# Patient Record
Sex: FEMALE | Race: WHITE | NOT HISPANIC OR LATINO | Employment: OTHER | URBAN - METROPOLITAN AREA
[De-identification: names, ages, dates, MRNs, and addresses within clinical notes are randomized per-mention and may not be internally consistent; named-entity substitution may affect disease eponyms.]

---

## 2017-01-25 LAB
CHOLEST SERPL-MSCNC: 213 MG/DL (ref 0–200)
HDLC SERPL-MCNC: 34 MG/DL
LDLC SERPL CALC-MCNC: 111 MG/DL
NONHDLC SERPL-MCNC: 179 MG/DL
TRIGL SERPL-MCNC: 342 MG/DL
TSH SERPL DL<=0.05 MIU/L-ACNC: 2.33 UIU/ML

## 2017-05-08 ENCOUNTER — LAB VISIT (OUTPATIENT)
Dept: LAB | Facility: HOSPITAL | Age: 82
End: 2017-05-08
Attending: FAMILY MEDICINE
Payer: MEDICARE

## 2017-05-08 ENCOUNTER — OFFICE VISIT (OUTPATIENT)
Dept: INTERNAL MEDICINE | Facility: CLINIC | Age: 82
End: 2017-05-08
Payer: MEDICARE

## 2017-05-08 VITALS
OXYGEN SATURATION: 97 % | DIASTOLIC BLOOD PRESSURE: 78 MMHG | SYSTOLIC BLOOD PRESSURE: 136 MMHG | HEIGHT: 63 IN | WEIGHT: 176.81 LBS | BODY MASS INDEX: 31.33 KG/M2 | TEMPERATURE: 100 F | HEART RATE: 79 BPM

## 2017-05-08 DIAGNOSIS — Z76.89 ESTABLISHING CARE WITH NEW DOCTOR, ENCOUNTER FOR: ICD-10-CM

## 2017-05-08 DIAGNOSIS — E03.9 ACQUIRED HYPOTHYROIDISM: ICD-10-CM

## 2017-05-08 DIAGNOSIS — R26.89 DECREASED MOBILITY: ICD-10-CM

## 2017-05-08 DIAGNOSIS — I27.21 PULMONARY HYPERTENSIVE ARTERIAL DISEASE: ICD-10-CM

## 2017-05-08 DIAGNOSIS — E55.9 UNSPECIFIED VITAMIN D DEFICIENCY: ICD-10-CM

## 2017-05-08 DIAGNOSIS — I10 ESSENTIAL HYPERTENSION: ICD-10-CM

## 2017-05-08 DIAGNOSIS — J30.2 SEASONAL ALLERGIC RHINITIS, UNSPECIFIED ALLERGIC RHINITIS TRIGGER: ICD-10-CM

## 2017-05-08 DIAGNOSIS — N18.4 CKD (CHRONIC KIDNEY DISEASE) STAGE 4, GFR 15-29 ML/MIN: ICD-10-CM

## 2017-05-08 DIAGNOSIS — Z95.0 PACEMAKER: ICD-10-CM

## 2017-05-08 DIAGNOSIS — I63.9 CEREBELLAR INFARCT: ICD-10-CM

## 2017-05-08 DIAGNOSIS — I63.9 CEREBROVASCULAR ACCIDENT (CVA), UNSPECIFIED MECHANISM: ICD-10-CM

## 2017-05-08 DIAGNOSIS — G25.81 RESTLESS LEG: ICD-10-CM

## 2017-05-08 DIAGNOSIS — Z76.89 ESTABLISHING CARE WITH NEW DOCTOR, ENCOUNTER FOR: Primary | ICD-10-CM

## 2017-05-08 DIAGNOSIS — E78.5 HYPERLIPIDEMIA, UNSPECIFIED HYPERLIPIDEMIA TYPE: ICD-10-CM

## 2017-05-08 DIAGNOSIS — M79.605 LEG PAIN, BILATERAL: ICD-10-CM

## 2017-05-08 DIAGNOSIS — M79.604 LEG PAIN, BILATERAL: ICD-10-CM

## 2017-05-08 DIAGNOSIS — I25.10 CORONARY ARTERY DISEASE INVOLVING NATIVE CORONARY ARTERY OF NATIVE HEART WITHOUT ANGINA PECTORIS: ICD-10-CM

## 2017-05-08 DIAGNOSIS — I70.0 ATHEROSCLEROSIS OF AORTA: ICD-10-CM

## 2017-05-08 DIAGNOSIS — I12.9 HYPERTENSIVE RENAL DISEASE, STAGE 1-4 OR UNSPECIFIED CHRONIC KIDNEY DISEASE: ICD-10-CM

## 2017-05-08 DIAGNOSIS — I35.0 AORTIC VALVE STENOSIS, UNSPECIFIED ETIOLOGY: ICD-10-CM

## 2017-05-08 PROBLEM — Z79.01 CHRONIC ANTICOAGULATION: Status: RESOLVED | Noted: 2017-05-08 | Resolved: 2017-05-08

## 2017-05-08 PROBLEM — Z79.01 CHRONIC ANTICOAGULATION: Status: ACTIVE | Noted: 2017-05-08

## 2017-05-08 PROBLEM — I48.91 ATRIAL FIBRILLATION: Status: ACTIVE | Noted: 2017-05-08

## 2017-05-08 PROBLEM — I48.91 ATRIAL FIBRILLATION: Status: RESOLVED | Noted: 2017-05-08 | Resolved: 2017-05-08

## 2017-05-08 LAB
25(OH)D3+25(OH)D2 SERPL-MCNC: 34 NG/ML
ALBUMIN SERPL BCP-MCNC: 4.1 G/DL
ALP SERPL-CCNC: 92 U/L
ALT SERPL W/O P-5'-P-CCNC: 17 U/L
ANION GAP SERPL CALC-SCNC: 10 MMOL/L
AST SERPL-CCNC: 27 U/L
BILIRUB SERPL-MCNC: 0.6 MG/DL
BUN SERPL-MCNC: 24 MG/DL
CALCIUM SERPL-MCNC: 10.3 MG/DL
CHLORIDE SERPL-SCNC: 99 MMOL/L
CO2 SERPL-SCNC: 29 MMOL/L
CREAT SERPL-MCNC: 1.3 MG/DL
EST. GFR  (AFRICAN AMERICAN): 41.4 ML/MIN/1.73 M^2
EST. GFR  (NON AFRICAN AMERICAN): 36 ML/MIN/1.73 M^2
GLUCOSE SERPL-MCNC: 105 MG/DL
POTASSIUM SERPL-SCNC: 3.7 MMOL/L
PROT SERPL-MCNC: 8.2 G/DL
SODIUM SERPL-SCNC: 138 MMOL/L
TSH SERPL DL<=0.005 MIU/L-ACNC: 1.95 UIU/ML

## 2017-05-08 PROCEDURE — 84443 ASSAY THYROID STIM HORMONE: CPT

## 2017-05-08 PROCEDURE — 82306 VITAMIN D 25 HYDROXY: CPT

## 2017-05-08 PROCEDURE — 36415 COLL VENOUS BLD VENIPUNCTURE: CPT

## 2017-05-08 PROCEDURE — 99204 OFFICE O/P NEW MOD 45 MIN: CPT | Mod: S$GLB,,, | Performed by: FAMILY MEDICINE

## 2017-05-08 PROCEDURE — 1159F MED LIST DOCD IN RCRD: CPT | Mod: S$GLB,,, | Performed by: FAMILY MEDICINE

## 2017-05-08 PROCEDURE — 1125F AMNT PAIN NOTED PAIN PRSNT: CPT | Mod: S$GLB,,, | Performed by: FAMILY MEDICINE

## 2017-05-08 PROCEDURE — 1160F RVW MEDS BY RX/DR IN RCRD: CPT | Mod: S$GLB,,, | Performed by: FAMILY MEDICINE

## 2017-05-08 PROCEDURE — 99999 PR PBB SHADOW E&M-EST. PATIENT-LVL III: CPT | Mod: PBBFAC,,, | Performed by: FAMILY MEDICINE

## 2017-05-08 PROCEDURE — 80053 COMPREHEN METABOLIC PANEL: CPT

## 2017-05-08 RX ORDER — LEVOTHYROXINE SODIUM 75 UG/1
TABLET ORAL
COMMUNITY
Start: 2016-11-16 | End: 2017-06-27 | Stop reason: SDUPTHER

## 2017-05-08 RX ORDER — ZOLPIDEM TARTRATE 5 MG/1
TABLET ORAL
COMMUNITY
Start: 2017-01-02 | End: 2017-06-27

## 2017-05-08 RX ORDER — FUROSEMIDE 40 MG/1
TABLET ORAL
COMMUNITY
Start: 2017-01-02 | End: 2017-06-27 | Stop reason: SDUPTHER

## 2017-05-08 RX ORDER — TRAMADOL HYDROCHLORIDE 50 MG/1
50 TABLET ORAL EVERY 8 HOURS
Refills: 5 | COMMUNITY
Start: 2017-05-04 | End: 2017-05-08

## 2017-05-08 RX ORDER — SIMVASTATIN 10 MG/1
TABLET, FILM COATED ORAL
COMMUNITY
Start: 2017-01-02 | End: 2017-06-27 | Stop reason: SDUPTHER

## 2017-05-08 RX ORDER — METOPROLOL TARTRATE 25 MG/1
25 TABLET, FILM COATED ORAL
COMMUNITY
Start: 2016-01-12 | End: 2017-05-08

## 2017-05-08 RX ORDER — TEMAZEPAM 30 MG/1
CAPSULE ORAL
Refills: 5 | COMMUNITY
Start: 2017-05-04 | End: 2017-05-08

## 2017-05-08 RX ORDER — HYDROCODONE BITARTRATE AND ACETAMINOPHEN 7.5; 325 MG/1; MG/1
1 TABLET ORAL
COMMUNITY
Start: 2017-03-06 | End: 2017-05-08 | Stop reason: SDUPTHER

## 2017-05-08 RX ORDER — DOCUSATE SODIUM 100 MG/1
100 CAPSULE, LIQUID FILLED ORAL
COMMUNITY
Start: 2015-01-05

## 2017-05-08 RX ORDER — VIT C/E/ZN/COPPR/LUTEIN/ZEAXAN 250MG-90MG
1000 CAPSULE ORAL
COMMUNITY
Start: 2015-01-05 | End: 2017-05-08

## 2017-05-08 RX ORDER — HYDROCODONE BITARTRATE AND ACETAMINOPHEN 7.5; 325 MG/1; MG/1
1 TABLET ORAL
Qty: 45 TABLET | Refills: 0 | Status: SHIPPED | OUTPATIENT
Start: 2017-05-08 | End: 2017-06-27

## 2017-05-08 RX ORDER — ACETAMINOPHEN 500 MG
TABLET ORAL
COMMUNITY
Start: 2017-02-03

## 2017-05-08 RX ORDER — FENOFIBRATE 160 MG/1
TABLET ORAL
COMMUNITY
Start: 2014-10-06 | End: 2017-05-10

## 2017-05-08 RX ORDER — CLOPIDOGREL BISULFATE 75 MG/1
TABLET ORAL
COMMUNITY
Start: 2017-01-02 | End: 2017-06-27 | Stop reason: SDUPTHER

## 2017-05-08 RX ORDER — METOPROLOL TARTRATE 25 MG/1
25 TABLET, FILM COATED ORAL 2 TIMES DAILY
COMMUNITY
Start: 2017-04-21

## 2017-05-08 RX ORDER — SOTALOL HYDROCHLORIDE 80 MG/1
TABLET ORAL
COMMUNITY
Start: 2016-11-16 | End: 2017-07-27 | Stop reason: SDUPTHER

## 2017-05-08 RX ORDER — ASPIRIN 325 MG
325 TABLET ORAL DAILY
COMMUNITY
End: 2017-07-27

## 2017-05-08 NOTE — ASSESSMENT & PLAN NOTE
Creatinine Monitored  Not on ACE currently  Records from Dr. Mary on vascular studies done previously  Appt with Cards end of month

## 2017-05-08 NOTE — PATIENT INSTRUCTIONS
Fall Prevention  Falls often occur due to slipping, tripping or losing your balance. Millions of people fall every year and injure themselves. Here are ways to reduce your risk of falling again.  · Think about your fall, was there anything that caused your fall that can be fixed, removed, or replaced?  · Make your home safe by keeping walkways clear of objects you may trip over.  · Use non-slip pads under rugs. Do not use area rugs or small throw rugs.  · Use non-slip mats in bathtubs and showers.  · Install handrails and lights on staircases.  · Do not walk in poorly lit areas.  · Do not stand on chairs or wobbly ladders.  · Use caution when reaching overhead or looking upward. This position can cause a loss of balance.  · Be sure your shoes fit properly, have non-slip bottoms and are in good condition.   · Wear shoes both inside and out. Avoid going barefoot or wearing slippers.  · Be cautious when going up and down stairs, curbs, and when walking on uneven sidewalks.  · If your balance is poor, consider using a cane or walker.  · If your fall was related to alcohol use, stop or limit alcohol intake.   · If your fall was related to use of sleeping medicines, talk to your doctor about this. You may need to reduce your dosage at bedtime if you awaken during the night to go to the bathroom.    · To reduce the need for nighttime bathroom trips:  ¨ Avoid drinking fluids for several hours before going to bed  ¨ Empty your bladder before going to bed  ¨ Men can keep a urinal at the bedside  · Stay as active as you can. Balance, flexibility, strength, and endurance all come from exercise. They all play a role in preventing falls. Ask your healthcare provider which types of activity are right for you.  · Get your vision checked on a regular basis.  · If you have pets, know where they are before you stand up or walk so you don't trip over them.  · Use night lights.  Date Last Reviewed: 11/5/2015  © 8919-7111 [employer]. [address] [place] [address], [place] [address]. All rights reserved. This information is not intended as a substitute for professional medical care. Always follow your healthcare professional's instructions.

## 2017-05-08 NOTE — ASSESSMENT & PLAN NOTE
Reported on Echo in 2014  No other echo in Care Everywhere  Cards appt end of month  Last echo shows EF 55-60% in 2014, Diastolic Dysfunction  Aortic Valve Regurgitation and Aortic valve stenosis.

## 2017-05-08 NOTE — ASSESSMENT & PLAN NOTE
Reports RLS  Chronic changes of venous stasis noted.  Has been on long term [place] per Dr. Roberts.  Refilled today  Discussed risks with polypharmacy.  Patient aware.   Will continue to treat for now.

## 2017-05-08 NOTE — ASSESSMENT & PLAN NOTE
Stable  Last Labs in Jan also reviewed with Dr. Roberts  Creatinine 1.19 at that time.  Monitor every 3-6 months due to being on chronic diuretics.        CMP  Sodium   Date Value Ref Range Status   11/05/2014 129 (L) 136 - 145 mmol/L Final     Potassium   Date Value Ref Range Status   11/05/2014 5.3 (H) 3.5 - 5.1 mmol/L Final     Chloride   Date Value Ref Range Status   11/05/2014 99 95 - 110 mmol/L Final     CO2   Date Value Ref Range Status   11/05/2014 17 (L) 23 - 29 mmol/L Final     Glucose   Date Value Ref Range Status   11/05/2014 97 70 - 110 mg/dL Final     BUN, Bld   Date Value Ref Range Status   11/05/2014 43 (H) 8 - 23 mg/dL Final     Creatinine   Date Value Ref Range Status   11/05/2014 1.8 (H) 0.5 - 1.4 mg/dL Final     Calcium   Date Value Ref Range Status   11/05/2014 9.6 8.7 - 10.5 mg/dL Final     Total Protein   Date Value Ref Range Status   11/04/2014 6.8 6.0 - 8.4 g/dL Final     Albumin   Date Value Ref Range Status   11/04/2014 3.7 3.5 - 5.2 g/dL Final     Total Bilirubin   Date Value Ref Range Status   11/04/2014 0.8 0.1 - 1.0 mg/dL Final     Comment:     For infants and newborns, interpretation of results should be based  on gestational age, weight and in agreement with clinical  observations.  Premature Infant recommended reference ranges:  Up to 24 hours.............<8.0 mg/dL  Up to 48 hours............<12.0 mg/dL  3-5 days..................<15.0 mg/dL  6-29 days.................<15.0 mg/dL       Alkaline Phosphatase   Date Value Ref Range Status   11/04/2014 29 (L) 55 - 135 U/L Final     AST   Date Value Ref Range Status   11/04/2014 30 10 - 40 U/L Final     ALT   Date Value Ref Range Status   11/04/2014 14 10 - 44 U/L Final     Anion Gap   Date Value Ref Range Status   11/05/2014 13 8 - 16 mmol/L Final     eGFR if    Date Value Ref Range Status   11/05/2014 29 (A) >60 mL/min/1.73 m^2 Final     eGFR if non    Date Value Ref Range Status   11/05/2014 25 (A) >60  mL/min/1.73 m^2 Final     Comment:     Calculation used to obtain the estimated glomerular filtration  rate (eGFR) is the CKD-EPI equation. Since race is unknown   in our information system, the eGFR values for   -American and Non--American patients are given   for each creatinine result.

## 2017-05-08 NOTE — ASSESSMENT & PLAN NOTE
Valve replacement 2 years ago  Reports mimimal SOB with exertion  Denies CP   On ASA and Plavix post value  Due for Echo  Cards follow up with Dr. Mary at end of month  Records requested

## 2017-05-08 NOTE — ASSESSMENT & PLAN NOTE
Reported per chart review  Has Cards appt end of month.  Records from Dr. Mary  Due to recheck on Echo.  Mild SOB reported on exertion. Chronic and stable  No sign of Cardiopulmonary decompensation  On Lasix for diuresis

## 2017-05-08 NOTE — ASSESSMENT & PLAN NOTE
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  No residual sequela  BP controlled  She is on ASA and Plavix.

## 2017-05-08 NOTE — ASSESSMENT & PLAN NOTE
No records on chart, reports stent history in past  Records requested from Dr. Mary  Chest pain free  On Zocor, ASA and Plavix for medication risk reduction/med management.

## 2017-05-08 NOTE — MR AVS SNAPSHOT
[facility]  [address] [facility] [address]  Phone: [phone]  Fax: [phone]                  Sherly Franco   2017 2:00 PM   Office Visit    Description:  Female : 1925   Provider:  Racquel Cowan MD   Department:  [facility]           Reason for Visit     Establish Care           Diagnoses this Visit        Comments    Establishing care with new doctor, encounter for    -  Primary     Acquired hypothyroidism         Hypertensive renal disease, stage 1-4 or unspecified chronic kidney disease         Unspecified vitamin D deficiency         Cerebellar infarct         Cerebrovascular accident (CVA), unspecified mechanism         Aortic valve stenosis, unspecified etiology         Essential hypertension         Coronary artery disease involving native coronary artery of native heart without angina pectoris         Atherosclerosis of aorta         Hyperlipidemia, unspecified hyperlipidemia type         Pacemaker         Restless leg         Leg pain, bilateral         Decreased mobility                To Do List           Future Appointments        Provider Department Dept Phone    2017 3:45 PM LAB, SAME DAY [facility] [phone]    2017 10:00 AM Racquel Cowan MD [facility] [phone]      Goals (5 Years of Data)     None      Follow-Up and Disposition     Return in about 3 months (around 2017) for Medication Recheck.       These Medications        Disp Refills Start End    hydrocodone-acetaminophen 7.5-325mg (NORCO) 7.5-325 mg per tablet 45 tablet 0 2017     Take 1 tablet by mouth every 24 hours as needed for Pain. - Oral    Pharmacy: [facility] [id number] [facility] - [place], [place] -  [facility] Ph #: [phone]         [facility]     [facility] Nurse Care Line -  Assistance  Unless otherwise directed by your provider, please contact [facility],  our nurse care line that is available for 24/7 assistance.     Registered nurses in the [facility] provide: appointment scheduling, clinical advisement, health education, and other advisory services.  Call: [phone] (toll free)               Medications           Message regarding Medications     Verify the changes and/or additions to your medication regime listed below are the same as discussed with your clinician today.  If any of these changes or additions are incorrect, please notify your healthcare provider.        START taking these NEW medications        Refills    hydrocodone-acetaminophen 7.5-325mg (NORCO) 7.5-325 mg per tablet 0    Sig: Take 1 tablet by mouth every 24 hours as needed for Pain.    Class: Print    Route: Oral      STOP taking these medications     cholecalciferol, vitamin D3, 1,000 unit capsule Take 1,000 Units by mouth.    tramadol (ULTRAM) 50 mg tablet Take 50 mg by mouth every 8 (eight) hours.    warfarin (COUMADIN) 2 MG tablet Take 1 tablet (2 mg total) by mouth Daily.    temazepam (RESTORIL) 30 mg capsule TAKE 1 TABLET BY MOUTH NIGHTLY AS NEEDED.    amlodipine (NORVASC) 5 MG tablet Take 5 mg by mouth once daily.    metolazone (ZAROXOLYN) 2.5 MG tablet Take 2.5 mg by mouth. Mon,Wed,Fri           Verify that the below list of medications is an accurate representation of the medications you are currently taking.  If none reported, the list may be blank. If incorrect, please contact your healthcare provider. Carry this list with you in case of emergency.           Current Medications     aspirin 325 MG tablet Take 325 mg by mouth once daily.    blood pressure monitor Kit     clopidogrel (PLAVIX) 75 mg tablet TAKE 1 TABLET EVERY DAY    docusate sodium (COLACE) 100 MG capsule Take 100 mg by mouth.    fenofibrate 160 MG Tab TAKE 1 TABLET EVERY DAY    furosemide (LASIX) 40 MG tablet TAKE 1 TABLET EVERY DAY    hydrocodone-acetaminophen 7.5-325mg (NORCO) 7.5-325 mg per tablet  "Take 1 tablet by mouth every 24 hours as needed for Pain.    levothyroxine (SYNTHROID) 75 MCG tablet TAKE 1 TABLET EVERY DAY    meclizine (ANTIVERT) 25 mg tablet Take 25 mg by mouth 3 (three) times daily as needed.    metoprolol tartrate (LOPRESSOR) 25 MG tablet     simvastatin (ZOCOR) 10 MG tablet TAKE 1 TABLET EVERY EVENING (NEED AN APPOINTMENT)    sotalol (BETAPACE) 80 MG tablet TAKE 1/2 TABLET TWICE DAILY    vitamin D (VITAMIN D3) 1000 units Tab Take 185 mg by mouth once daily.    vitamins-lipotropics (LIPO-FLAVONOID PLUS) 200-100 mg Tab Take by mouth daily as needed.    walker Misc Use daily as directed    zolpidem (AMBIEN) 5 MG Tab TAKE 1 TABLET AT NIGHT AS NEEDED FOR SLEEP (DUE FOR APPOINTMENT)    potassium chloride SA (K-DUR,KLOR-CON) 20 MEQ tablet Take 20 mEq by mouth once daily.    spironolactone (ALDACTONE) 50 MG tablet Take 50 mg by mouth once daily.           Clinical Reference Information           Your Vitals Were     BP Pulse Temp Height Weight SpO2    136/78 (BP Location: Left arm) 79 99.6 °F (37.6 °C) (Tympanic) 5' 3" (1.6 m) 80.2 kg (176 lb 12.9 oz) 97%    BMI                31.32 kg/m2          Blood Pressure          Most Recent Value    BP  136/78      Allergies as of 5/8/2017     Cortisone    Cephalosporins    Codeine    Corticosteroids (Glucocorticoids)    Penicillins      Immunizations Administered on Date of Encounter - 5/8/2017     None      Orders Placed During Today's Visit      Normal Orders This Visit    Ambulatory referral to Outpatient Case Management     Future Labs/Procedures Expected by Expires    Comprehensive metabolic panel  5/8/2017 7/7/2018    TSH  5/8/2017 7/7/2018    Vitamin D  5/8/2017 7/7/2018      [facility] Sign-Up     Activating your [facility] account is as easy as 1-2-3!     1) Visit [email or web address], select Sign Up Now, enter this activation code and your date of birth, then select Next.  [email or web address]  Expires: 6/22/2017  2:39 PM      2) Create a username and " password to use when you visit [facility] in the future and select a security question in case you lose your password and select Next.    3) Enter your e-mail address and click Sign Up!    Additional Information  If you have questions, please e-mail [email or web address] or call [phone] to talk to our [facility] staff. Remember, [facility] is NOT to be used for urgent needs. For medical emergencies, dial 911.         Instructions      Fall Prevention  Falls often occur due to slipping, tripping or losing your balance. Millions of people fall every year and injure themselves. Here are ways to reduce your risk of falling again.  · Think about your fall, was there anything that caused your fall that can be fixed, removed, or replaced?  · Make your home safe by keeping walkways clear of objects you may trip over.  · Use non-slip pads under rugs. Do not use area rugs or small throw rugs.  · Use non-slip mats in bathtubs and showers.  · Install handrails and lights on staircases.  · Do not walk in poorly lit areas.  · Do not stand on chairs or wobbly ladders.  · Use caution when reaching overhead or looking upward. This position can cause a loss of balance.  · Be sure your shoes fit properly, have non-slip bottoms and are in good condition.   · Wear shoes both inside and out. Avoid going barefoot or wearing slippers.  · Be cautious when going up and down stairs, curbs, and when walking on uneven sidewalks.  · If your balance is poor, consider using a cane or walker.  · If your fall was related to alcohol use, stop or limit alcohol intake.   · If your fall was related to use of sleeping medicines, talk to your doctor about this. You may need to reduce your dosage at bedtime if you awaken during the night to go to the bathroom.    · To reduce the need for nighttime bathroom trips:  ¨ Avoid drinking fluids for several hours before going to bed  ¨ Empty your bladder before going to bed  ¨ Men can keep a urinal at the  bedside  · Stay as active as you can. Balance, flexibility, strength, and endurance all come from exercise. They all play a role in preventing falls. Ask your healthcare provider which types of activity are right for you.  · Get your vision checked on a regular basis.  · If you have pets, know where they are before you stand up or walk so you don't trip over them.  · Use night lights.  Date Last Reviewed: 11/5/2015  © 3843-0916 [employer]. [address] [place] [address]. All rights reserved. This information is not intended as a substitute for professional medical care. Always follow your healthcare professional's instructions.             Language Assistance Services     ATTENTION: Language assistance services are available, free of charge. Please call [phone].      ATENCIÓN: Si delisa kelsey, tiene a banks disposición servicios gratuitos de asistencia lingüística. Llame al [phone].     CLARA Ý: N?u b?n nói Ti?ng Vi?t, có các d?ch v? h? tr? ngôn ng? mi?n phí dành cho b?n. G?i s? [phone].         [facility] complies with applicable Federal civil rights laws and does not discriminate on the basis of race, color, national origin, age, disability, or sex.

## 2017-05-08 NOTE — PROGRESS NOTES
Sherly Franco  05/08/2017  [id number]    Racquel Cowan MD  Patient Care Team:  Racquel Cowan MD as PCP - General (Family Medicine)    Has the patient seen any provider outside of the network since the last visit ? (yes). If yes, HIPPA forms completed and records requested.  Care Everywhere reviewed in office during and prior to visit to review records.     Visit Type:New patient, establish care.     Chief Complaint:  Chief Complaint   Patient presents with    Establish Care       History of Present Illness:  89 year old who has been followed by Dr. Roberts at [facility] for years, and Dr. Mary Cardiologist, is here as her insurance switched her PCP.    Her Last visit and annual exam was done with Dr. Roberts in Jan of this year.  She had annual exam.    She gives history of CAD, with a stent. Unknown where per patient. She had pacemaker placed for arrythmia, but does not know what kind.  She was admitted for dehydration and pre syncope in 2014.  Soon after that admission she had aortic valve replaced.  She reports only mild SOB with exertion.  She has mild pedal edema which is chronic. She is on Lasix. Un clear if still on Aldactone. Will need to review with Cards notes.     She has RLS and chronic leg pain. Dr. Roberts has given her Norco and Ultram for her pain.  She tries to elevate this when she can.     She is still living independently. Tries not to drive if she doesn't have too.  Reports social issues with trying to get her services for seniors at home. She has worked with outpatient case management when at Dr. Roberts office.    She had old stroke, Documented back to 2012. No changes.    She has appt end of month with Dr. Mary.            Screening Questionnaires:    In the last two weeks how often have you felt down, depressed, or hopeless ( no )    In the last two weeks how often have you had little interest or pleasure in doing  (no )    In the last two weeks how often have you been  bothered by the following problems:  1. Feeling nervous, anxious, or on edge ( no )    2. Not being able to stop or control worrying ( intermittent)    3. Worrying too much about different things ( no)    4. Trouble relaxing ( no )    5. Being so restless that it is hard to sit still  (no )    6. Becoming easily annoyed or irritable (no)    7. Feeling afraid as if something awful might happen (no )    How often do you have a drink containing Alcohol? denied     Do you exercise  (no ) not active    Do you take a baby Aspirin daily ( yes)    Do you have an advance directive ( no ) The patient was given information regarding Living Will/Durable Power-of- if requested.     The following were reviewed: Active problem list, medication list, allergies, family history, social history, and Health Maintenance.     History:  Past Medical History:   Diagnosis Date    A-fib     s/p pacemaker in 09/14    Coronary artery disease     s/p PCI in 2014    High cholesterol     Hypertension     Stroke     Thyroid disease      Past Surgical History:   Procedure Laterality Date    APPENDECTOMY      BACK SURGERY      BREAST SURGERY      CARDIAC PACEMAKER PLACEMENT      CARDIAC PACEMAKER PLACEMENT      CHOLECYSTECTOMY      pace      TONSILLECTOMY       Family History   Problem Relation Age of Onset    Cancer Mother     Cancer Father     Cancer Sister      Social History     Social History    Marital status:      Spouse name: N/A    Number of children: 2    Years of education: N/A     Occupational History    Not on file.     Social History Main Topics    Smoking status: Former Smoker    Smokeless tobacco: Never Used    Alcohol use No    Drug use: No    Sexual activity: Not on file     Other Topics Concern    Not on file     Social History Narrative    No narrative on file     Patient Active Problem List   Diagnosis    Cerebellar infarct    Aortic stenosis    CKD (chronic kidney disease) stage 4,  GFR 15-29 ml/min    Essential hypertension    Coronary artery disease involving native coronary artery without angina pectoris    Pacemaker    Acquired hypothyroidism    Allergic rhinitis    Atherosclerosis of aorta    Cerebrovascular accident (CVA)    History of stroke    History of malignant neoplasm of skin    Hyperlipidemia    Hypertensive renal disease    Pulmonary hypertensive arterial disease    Restless leg    Unspecified vitamin D deficiency    Establishing care with new doctor, encounter for    Leg pain, bilateral    Decreased mobility     Review of patient's allergies indicates:   Allergen Reactions    Cortisone Hives    Cephalosporins     Codeine     Corticosteroids (glucocorticoids)     Penicillins        Health Maintenance  Health Maintenance Topics with due status: Not Due       Topic Last Completion Date    Lipid Panel 11/04/2014    Influenza Vaccine 01/25/2017    TETANUS VACCINE 05/08/2017    DEXA SCAN 05/08/2017     There are no preventive care reminders to display for this patient.    Medications:  Current Outpatient Prescriptions on File Prior to Visit   Medication Sig Dispense Refill    meclizine (ANTIVERT) 25 mg tablet Take 25 mg by mouth 3 (three) times daily as needed.      vitamin D (VITAMIN D3) 1000 units Tab Take 185 mg by mouth once daily.      vitamins-lipotropics (LIPO-FLAVONOID PLUS) 200-100 mg Tab Take by mouth daily as needed.      potassium chloride SA (K-DUR,KLOR-CON) 20 MEQ tablet Take 20 mEq by mouth once daily.      spironolactone (ALDACTONE) 50 MG tablet Take 50 mg by mouth once daily.      [DISCONTINUED] amlodipine (NORVASC) 5 MG tablet Take 5 mg by mouth once daily.      [DISCONTINUED] clopidogrel (PLAVIX) 75 mg tablet Take 75 mg by mouth once daily.      [DISCONTINUED] docusate sodium (COLACE) 100 MG capsule Take 100 mg by mouth once daily.      [DISCONTINUED] fenofibrate 160 MG Tab Take 160 mg by mouth once daily.      [DISCONTINUED]  furosemide (LASIX) 40 MG tablet Take 0.5 tablets (20 mg total) by mouth once daily.      [DISCONTINUED] hydrocodone-acetaminophen 7.5-325mg (NORCO) 7.5-325 mg per tablet Take 1 tablet by mouth every evening.      [DISCONTINUED] hydrOXYzine (ATARAX) 25 MG tablet Take 25 mg by mouth nightly as needed for Itching.      [DISCONTINUED] levothyroxine (SYNTHROID) 75 MCG tablet Take 75 mcg by mouth once daily.      [DISCONTINUED] metolazone (ZAROXOLYN) 2.5 MG tablet Take 2.5 mg by mouth. Mon,Wed,Fri      [DISCONTINUED] simvastatin (ZOCOR) 20 MG tablet Take 20 mg by mouth every evening.      [DISCONTINUED] sotalol (BETAPACE) 80 MG tablet Take 40 mg by mouth 2 (two) times daily.      [DISCONTINUED] warfarin (COUMADIN) 2 MG tablet Take 1 tablet (2 mg total) by mouth Daily.      [DISCONTINUED] zolpidem (AMBIEN) 5 MG Tab Take 5 mg by mouth nightly as needed.       No current facility-administered medications on file prior to visit.        Medications have been reviewed and reconciled with patient at visit today.    Barriers to medications present (no )    Adverse reactions to current medications (no)    Over the counter medications reviewed (Yes) and if needed added to active Medication list.    Exam:  Vitals:    05/08/17 1348   BP: 136/78   Pulse: 79   Temp: 99.6 °F (37.6 °C)     Weight: 80.2 kg (176 lb 12.9 oz)   Body mass index is 31.32 kg/(m^2).    Review of Systems   Constitutional: Negative for chills, fever and malaise/fatigue.   HENT: Negative.  Negative for ear pain.    Eyes: Negative for blurred vision, double vision and photophobia.   Respiratory: Positive for shortness of breath. Negative for cough, hemoptysis, sputum production and wheezing.    Cardiovascular: Positive for leg swelling. Negative for chest pain, palpitations and orthopnea.   Gastrointestinal: Positive for constipation. Negative for blood in stool, nausea and vomiting.   Genitourinary: Negative for dysuria and urgency.   Musculoskeletal:  Positive for joint pain. Negative for falls.   Skin: Negative for itching and rash.   Neurological: Negative for dizziness, tingling, focal weakness, weakness and headaches.   Endo/Heme/Allergies: Does not bruise/bleed easily.   Psychiatric/Behavioral: Negative for depression, memory loss and suicidal ideas. The patient has insomnia.        Physical Exam   Constitutional: She is oriented to person, place, and time. She appears well-developed and well-nourished.   HENT:   Head: Normocephalic and atraumatic.   Mouth/Throat: Oropharynx is clear and moist.   Eyes: EOM are normal. Pupils are equal, round, and reactive to light.   Neck: Normal range of motion. Neck supple. No thyromegaly present.   Cardiovascular: Normal rate and regular rhythm.  Exam reveals no friction rub.    Murmur heard.   Crescendo systolic murmur is present with a grade of 2/6   Pulmonary/Chest: Effort normal and breath sounds normal. No respiratory distress. She has no wheezes.   Abdominal: Soft. Bowel sounds are normal. She exhibits no distension. There is no tenderness.   Musculoskeletal:   Walks with Walker     Lymphadenopathy:     She has no cervical adenopathy.   Neurological: She is alert and oriented to person, place, and time.   Skin:   Chronic changes lower ext with venous stasis.  No cellulitis  Trace edema to shin, equal bilaterally     Psychiatric: She has a normal mood and affect. Her behavior is normal. Judgment and thought content normal.   Nursing note and vitals reviewed.      Laboratory Reviewed: (Yes)  Lab Results   Component Value Date    WBC 7.43 11/04/2014    HGB 12.3 11/04/2014    HCT 37.7 11/04/2014     11/04/2014    CHOL 147 11/04/2014    TRIG 340 (H) 11/04/2014    HDL 12 (L) 11/04/2014    ALT 14 11/04/2014    AST 30 11/04/2014     (L) 11/05/2014    K 5.3 (H) 11/05/2014    CL 99 11/05/2014    CREATININE 1.8 (H) 11/05/2014    BUN 43 (H) 11/05/2014    CO2 17 (L) 11/05/2014    TSH 0.682 11/03/2014    INR 3.4 (H)  11/05/2014       Assessment:  The primary encounter diagnosis was Establishing care with new doctor, encounter for. Diagnoses of Acquired hypothyroidism, Hypertensive renal disease, stage 1-4 or unspecified chronic kidney disease, Unspecified vitamin D deficiency, Cerebellar infarct, Cerebrovascular accident (CVA), unspecified mechanism, Aortic valve stenosis, unspecified etiology, Essential hypertension, Coronary artery disease involving native coronary artery of native heart without angina pectoris, Atherosclerosis of aorta, Hyperlipidemia, unspecified hyperlipidemia type, Pacemaker, Restless leg, Leg pain, bilateral, Decreased mobility, CKD (chronic kidney disease) stage 4, GFR 15-29 ml/min, Pulmonary hypertensive arterial disease, and Seasonal allergic rhinitis, unspecified allergic rhinitis trigger were also pertinent to this visit.    Plan:  Cerebellar infarct  Old Infarct, last imaging 2014, and it was old, previous infarct right celebrellar hemisphere.  No residual sequela  BP controlled  She is on ASA and Plavix.        Cerebrovascular accident (CVA)  Stable  On ASA and Plavix  No residual Effects    Leg pain, bilateral  Reports RLS  Chronic changes of venous stasis noted.  Has been on long term [place] per Dr. Roberts.  Refilled today  Discussed risks with polypharmacy.  Patient aware.   Will continue to treat for now.      Allergic rhinitis  Stable, due to environment  Prn Zyrtec OTC    Pulmonary hypertensive arterial disease  Reported per chart review  Has Cards appt end of month.  Records from Dr. Mary  Due to recheck on Echo.  Mild SOB reported on exertion. Chronic and stable  No sign of Cardiopulmonary decompensation  On Lasix for diuresis    Aortic stenosis  Valve replacement 2 years ago  Reports mimimal SOB with exertion  Denies CP   On ASA and Plavix post value  Due for Echo  Cards follow up with Dr. Mary at end of month  Records requested    Essential hypertension  BP in goal range  On  Metoprolol  Stable    Coronary artery disease involving native coronary artery without angina pectoris  No records on chart, reports stent history in past  Records requested from Dr. Mary  Chest pain free  On Zocor, ASA and Plavix for medication risk reduction/med management.      Atherosclerosis of aorta  Reported on Echo in 2014  No other echo in Care Everywhere  Cards appt end of month  Last echo shows EF 55-60% in 2014, Diastolic Dysfunction  Aortic Valve Regurgitation and Aortic valve stenosis.        CKD (chronic kidney disease) stage 4, GFR 15-29 ml/min  Stable  Last Labs in Jan also reviewed with Dr. Roberts  Creatinine 1.19 at that time.  Monitor every 3-6 months due to being on chronic diuretics.        CMP  Sodium   Date Value Ref Range Status   11/05/2014 129 (L) 136 - 145 mmol/L Final     Potassium   Date Value Ref Range Status   11/05/2014 5.3 (H) 3.5 - 5.1 mmol/L Final     Chloride   Date Value Ref Range Status   11/05/2014 99 95 - 110 mmol/L Final     CO2   Date Value Ref Range Status   11/05/2014 17 (L) 23 - 29 mmol/L Final     Glucose   Date Value Ref Range Status   11/05/2014 97 70 - 110 mg/dL Final     BUN, Bld   Date Value Ref Range Status   11/05/2014 43 (H) 8 - 23 mg/dL Final     Creatinine   Date Value Ref Range Status   11/05/2014 1.8 (H) 0.5 - 1.4 mg/dL Final     Calcium   Date Value Ref Range Status   11/05/2014 9.6 8.7 - 10.5 mg/dL Final     Total Protein   Date Value Ref Range Status   11/04/2014 6.8 6.0 - 8.4 g/dL Final     Albumin   Date Value Ref Range Status   11/04/2014 3.7 3.5 - 5.2 g/dL Final     Total Bilirubin   Date Value Ref Range Status   11/04/2014 0.8 0.1 - 1.0 mg/dL Final     Comment:     For infants and newborns, interpretation of results should be based  on gestational age, weight and in agreement with clinical  observations.  Premature Infant recommended reference ranges:  Up to 24 hours.............<8.0 mg/dL  Up to 48 hours............<12.0 mg/dL  3-5  days..................<15.0 mg/dL  6-29 days.................<15.0 mg/dL       Alkaline Phosphatase   Date Value Ref Range Status   11/04/2014 29 (L) 55 - 135 U/L Final     AST   Date Value Ref Range Status   11/04/2014 30 10 - 40 U/L Final     ALT   Date Value Ref Range Status   11/04/2014 14 10 - 44 U/L Final     Anion Gap   Date Value Ref Range Status   11/05/2014 13 8 - 16 mmol/L Final     eGFR if    Date Value Ref Range Status   11/05/2014 29 (A) >60 mL/min/1.73 m^2 Final     eGFR if non    Date Value Ref Range Status   11/05/2014 25 (A) >60 mL/min/1.73 m^2 Final     Comment:     Calculation used to obtain the estimated glomerular filtration  rate (eGFR) is the CKD-EPI equation. Since race is unknown   in our information system, the eGFR values for   -American and Non--American patients are given   for each creatinine result.           Hypertensive renal disease  Creatinine Monitored  Not on ACE currently  Records from Dr. Mary on vascular studies done previously  Appt with Cards end of month    Acquired hypothyroidism  On chronic replacement  Checking TSH today  LAst TSH at goal  Continue Levothyroxine 75 mcg daily    Referral To outpatient Case management to review any services for seniors she is able to get to help her continue independent living    Records from Dr. Mary requested.    Labs ordered      Follow up: Return in about 3 months (around 8/8/2017) for Medication Recheck.      Care Plan/Goals: Reviewed Yes  Goals     None              After visit summary printed and given to patient upon discharge.  Patient goals and care plan are included in After visit summary.

## 2017-05-09 ENCOUNTER — TELEPHONE (OUTPATIENT)
Dept: INTERNAL MEDICINE | Facility: CLINIC | Age: 82
End: 2017-05-09

## 2017-05-09 ENCOUNTER — OUTPATIENT CASE MANAGEMENT (OUTPATIENT)
Dept: ADMINISTRATIVE | Facility: OTHER | Age: 82
End: 2017-05-09

## 2017-05-09 NOTE — TELEPHONE ENCOUNTER
----- Message from Racquel Cowan MD sent at 5/9/2017  7:47 AM CDT -----  Notify patient  TSH is normal range  Vit D is 34, normal range  CMP, that shows electrolytes and her kidney function is stable    I need you to call [facility] office and get a medication list.  The patient was to call today and give the last medications she has on her counter so we can get an adequate Med Rec.    Thanks  Dr. Cowan

## 2017-05-09 NOTE — TELEPHONE ENCOUNTER
Left message on patients voicemail and left message with 's nurse to fax patients medication record.

## 2017-05-09 NOTE — PROGRESS NOTES
For your Information:    Please note the following patient has been assigned to MARGARET Hahn with [facility] for screening.    Reason: Low Risk  Decreased mobility    Please contact [facility] at ext [phone] with questions.    Thank you,  Yanet Valera, SSC

## 2017-05-09 NOTE — TELEPHONE ENCOUNTER
----- Message from Yanet Valera sent at 5/9/2017  2:52 PM CDT -----  Please note the following patient was indavertently assigned to MARGARET Mcnally in [facility]. This patient has been assigned to MARGARET Hahn. Necessary changes have been made to the patient's chart/care team.      Reason: Low Risk  Referral Diagnosis: Decreased mobility     Please contact [facility] at ext [phone] with questions.     Thank you,  Yanet Valera, SSC

## 2017-05-09 NOTE — TELEPHONE ENCOUNTER
----- Message from Yanet Valera sent at 5/9/2017  2:05 PM CDT -----  For your Information:    Please note the following patient has been assigned to MARGARET Mcnally with [facility] for screening.    Reason: Low Risk  Decreased mobility    Please contact [facility] at ext [phone] with questions.    Thank you,  Yanet Valera, SSC

## 2017-05-09 NOTE — TELEPHONE ENCOUNTER
I thought the patient would be Assigned to Tran Feliciano, the CSW that works here at [facility] for this?     Please follow up.    Patient is 89, lives alone and is having harder time with ADL for driving, cooking, etc.  Would like services explored for her to continue to live independently.    Thanks  Dr. Cowan

## 2017-05-10 ENCOUNTER — TELEPHONE (OUTPATIENT)
Dept: ADMINISTRATIVE | Facility: OTHER | Age: 82
End: 2017-05-10

## 2017-05-10 ENCOUNTER — SOCIAL WORK (OUTPATIENT)
Dept: ADMINISTRATIVE | Facility: OTHER | Age: 82
End: 2017-05-10

## 2017-05-10 NOTE — TELEPHONE ENCOUNTER
I reconciled her list based on the medication and chart review.  We will need to confirm with Dr. Wilcox notes.    Thanks  Dr. Cowan

## 2017-05-11 ENCOUNTER — TELEPHONE (OUTPATIENT)
Dept: ADMINISTRATIVE | Facility: OTHER | Age: 82
End: 2017-05-11

## 2017-05-11 NOTE — TELEPHONE ENCOUNTER
provided patient with various transportation services within the city as she previously requested assistance to her doctor's appointments, grocery store, and other errands in the area. Provided patient with [facility], but she declined due their long waiting periods to and from doctor's offices. Also, discussed with patient about [employer] transportation services but she declined due to their expense and long waiting periods to and from doctor's offices. In closing,  gave patient the name of [email or web address]'s [employer] and explained they charged $2.00 per mile either to doctor's appointments, grocery store, or other places she wants to travel. Patient had agreed to call the following company. She had also inquired about someone to provide light housecleaning services for her.  was unsuccessful with locating maid services with no cost. Discussed the following with patient by phone: [employer], [employer], and [employer] and will mail each company's information to patient.  will follow-up with patient in a few weeks to discuss the outcome regarding transportation and maid services.

## 2017-05-11 NOTE — TELEPHONE ENCOUNTER
called and left a voice messages as well as sending emails to the following agencies regarding transportation services for this patient. [employer], [employer], and [employer].

## 2017-06-02 ENCOUNTER — TELEPHONE (OUTPATIENT)
Dept: INTERNAL MEDICINE | Facility: CLINIC | Age: 82
End: 2017-06-02

## 2017-06-02 NOTE — TELEPHONE ENCOUNTER
Follow up call:  This  attempted to reach patient to follow up with her regarding community resources that was mailed to her 3 weeks ago. Left a message requesting a return call.

## 2017-06-27 RX ORDER — TRAMADOL HYDROCHLORIDE 50 MG/1
50 TABLET ORAL DAILY PRN
Status: CANCELLED | OUTPATIENT
Start: 2017-06-27 | End: 2017-07-07

## 2017-06-27 RX ORDER — TRAMADOL HYDROCHLORIDE 50 MG/1
50 TABLET ORAL EVERY 8 HOURS PRN
Refills: 5 | COMMUNITY
Start: 2017-06-01 | End: 2018-02-19 | Stop reason: SDUPTHER

## 2017-06-27 RX ORDER — TEMAZEPAM 15 MG/1
15 CAPSULE ORAL NIGHTLY
Refills: 5 | COMMUNITY
Start: 2017-06-19

## 2017-06-27 RX ORDER — SIMVASTATIN 10 MG/1
TABLET, FILM COATED ORAL
Qty: 90 TABLET | Refills: 0 | Status: SHIPPED | OUTPATIENT
Start: 2017-06-27 | End: 2017-08-31 | Stop reason: SDUPTHER

## 2017-06-27 RX ORDER — FUROSEMIDE 40 MG/1
40 TABLET ORAL DAILY
Qty: 90 TABLET | Refills: 0 | Status: SHIPPED | OUTPATIENT
Start: 2017-06-27 | End: 2017-08-31 | Stop reason: SDUPTHER

## 2017-06-27 RX ORDER — SOTALOL HYDROCHLORIDE 80 MG/1
40 TABLET ORAL 2 TIMES DAILY
Status: CANCELLED | OUTPATIENT
Start: 2017-06-27

## 2017-06-27 RX ORDER — CLOPIDOGREL BISULFATE 75 MG/1
75 TABLET ORAL DAILY
Qty: 90 TABLET | Refills: 0 | Status: SHIPPED | OUTPATIENT
Start: 2017-06-27 | End: 2017-08-31 | Stop reason: SDUPTHER

## 2017-06-27 RX ORDER — LEVOTHYROXINE SODIUM 75 UG/1
75 TABLET ORAL DAILY
Qty: 90 TABLET | Refills: 0 | Status: SHIPPED | OUTPATIENT
Start: 2017-06-27 | End: 2017-08-31 | Stop reason: SDUPTHER

## 2017-06-27 RX ORDER — TEMAZEPAM 15 MG/1
15 CAPSULE ORAL NIGHTLY PRN
Status: CANCELLED | OUTPATIENT
Start: 2017-06-27 | End: 2017-07-27

## 2017-06-27 RX ORDER — METOPROLOL TARTRATE 25 MG/1
TABLET, FILM COATED ORAL
Status: CANCELLED | OUTPATIENT
Start: 2017-06-27

## 2017-06-27 NOTE — TELEPHONE ENCOUNTER
Spoke with pt, she says that she is taking the betapace and lopressor (generic)  She says that she gets her Ultram & Restoril from [employer], she says that her son picks them up for and that she does have refills and don't worry about refilling them.  She see's Dr.Terry Mary, and the lady that does her pacemaker Marjorie Tolentino,her last visit with him was on May 25th and that she was told everything was fine and to follow up with hi in 6 months

## 2017-06-27 NOTE — TELEPHONE ENCOUNTER
Pt phoning, she is requesting a refill on all of her medications. She also wanted to let us know that she no longer takes hydrocodone-acetaminophen 7.5/325 mg because it does not help. She also does not take Ambien anymore.   Pt said that she phoned [facility] for her refill request and asked the  where was she located and was told in the [facility] and they got disconnected.  She wanted to know if [employer] tried to contact us for refills, I told the pt that we did not receive any phone calls from [facility] and that I would put in a request for her refills  Pt voiced understanding

## 2017-06-27 NOTE — TELEPHONE ENCOUNTER
We need list of medication from Dr. Mary Cardiology  IS she on Betapace and Lopressor.    What pharmacy does she get her Restoril from?  What pharmacy was she getting the Ultram from?    Please get active list of medication, and which ones does she need. Does Cardiology give her any medication refills?

## 2017-06-27 NOTE — TELEPHONE ENCOUNTER
----- Message from Bernarda Castorena sent at 6/27/2017  1:30 PM CDT -----  Would like to speak to nurse. Please call back at [phone]. thanks

## 2017-07-26 ENCOUNTER — TELEPHONE (OUTPATIENT)
Dept: INTERNAL MEDICINE | Facility: CLINIC | Age: 82
End: 2017-07-26

## 2017-07-26 NOTE — TELEPHONE ENCOUNTER
Pt phoning, upset because her sotalol has not been refilled  I explained to the pt that we are waiting on her cardiology office () to contact us with her med list. Pt says that she doesn't understand why we can't give her her meds and says that she is impatient. I explained to the pt that she is taking both sotalol and metoprolol tartrate (lopressor) she would like an explanation. I notified her that I will contact  office again and give her a call back asap  Pt voiced understanding

## 2017-07-27 RX ORDER — ASPIRIN 81 MG/1
81 TABLET ORAL DAILY
Refills: 0 | COMMUNITY
Start: 2017-07-27 | End: 2018-07-27

## 2017-07-27 RX ORDER — SOTALOL HYDROCHLORIDE 80 MG/1
40 TABLET ORAL 2 TIMES DAILY
Qty: 30 TABLET | Refills: 1 | Status: SHIPPED | OUTPATIENT
Start: 2017-07-27 | End: 2017-09-25 | Stop reason: SDUPTHER

## 2017-07-27 NOTE — TELEPHONE ENCOUNTER
I reviewed the med list.  Those are both the same drug class and I wanted to make sure Cards wanted her on both, so I confirmed with their medication list, and she is indeed on both.  I sent the refill to her pharmacy.    Sotalol 80 mg 1/2 tablet BID is what Cards sent to us.  Completed today

## 2017-08-08 PROBLEM — Z76.89 ESTABLISHING CARE WITH NEW DOCTOR, ENCOUNTER FOR: Status: RESOLVED | Noted: 2017-05-08 | Resolved: 2017-08-08

## 2017-08-15 NOTE — ASSESSMENT & PLAN NOTE
She has seen Cards,   Notes reviewed  On Lopressor and Sotalol confirmed by cards, Lasix as well.  No bradycardia  Stable Exam.  Records requested  Will need Echo

## 2017-08-15 NOTE — PROGRESS NOTES
Sherly Franco  08/16/2017  [id number]    Racquel Cowan MD  Patient Care Team:  Racquel Cowan MD as PCP - General (Family Medicine)  MARGARET Mccord as   Has the patient seen any provider outside of the [facility] network since the last visit? (yes). If yes, HIPPA forms completed and records requested.        Visit Type:a scheduled routine follow-up visit    Chief Complaint:  Chief Complaint   Patient presents with    Follow-up     3 Month        History of Present Illness:  89 year old. She is doing well today.  She denies any complaints but her continued lower leg pain.  She uses Ultram prn for this.   She has a hard time with pharmacy, as they will not fill early and she has limited transportation. She gets 90 day supplies for her other medications.    She saw Dr. Mary since last visit.  No change in her medication per him.  She is going to have echo in 6 months          History:  Past Medical History:   Diagnosis Date    A-fib     s/p pacemaker in 09/14    Coronary artery disease     s/p PCI in 2014    High cholesterol     Hypertension     Stroke     Thyroid disease      Past Surgical History:   Procedure Laterality Date    APPENDECTOMY      BACK SURGERY      BREAST SURGERY      CARDIAC PACEMAKER PLACEMENT      CARDIAC PACEMAKER PLACEMENT      CHOLECYSTECTOMY      pace      TONSILLECTOMY       Family History   Problem Relation Age of Onset    Cancer Mother     Cancer Father     Cancer Sister      Social History     Social History    Marital status:      Spouse name: N/A    Number of children: 2    Years of education: N/A     Occupational History    Not on file.     Social History Main Topics    Smoking status: Former Smoker    Smokeless tobacco: Former User    Alcohol use Yes      Comment: Christine    Drug use: No    Sexual activity: Not on file     Other Topics Concern    Not on file     Social History Narrative    No narrative on file     Patient  Active Problem List   Diagnosis    Aortic stenosis    CKD (chronic kidney disease) stage 4, GFR 15-29 ml/min    Essential hypertension    Coronary artery disease involving native coronary artery without angina pectoris    Pacemaker    Acquired hypothyroidism    Allergic rhinitis    Atherosclerosis of aorta    Cerebrovascular accident (CVA)    History of malignant neoplasm of skin    Hyperlipidemia    Hypertensive renal disease    Pulmonary hypertensive arterial disease    Restless leg    Unspecified vitamin D deficiency    Leg pain, bilateral    Decreased mobility     Review of patient's allergies indicates:   Allergen Reactions    Cortisone Hives    Cephalosporins     Codeine     Corticosteroids (glucocorticoids)     Penicillins        The following were reviewed at this visit: active problem list, medication list, allergies, family history, social history, and health maintenance.    Medications:  Current Outpatient Prescriptions on File Prior to Visit   Medication Sig Dispense Refill    aspirin (ECOTRIN) 81 MG EC tablet Take 1 tablet (81 mg total) by mouth once daily.  0    blood pressure monitor Kit       clopidogrel (PLAVIX) 75 mg tablet Take 1 tablet (75 mg total) by mouth once daily. 90 tablet 0    docusate sodium (COLACE) 100 MG capsule Take 100 mg by mouth.      furosemide (LASIX) 40 MG tablet Take 1 tablet (40 mg total) by mouth once daily. 90 tablet 0    levothyroxine (SYNTHROID) 75 MCG tablet Take 1 tablet (75 mcg total) by mouth once daily. 90 tablet 0    metoprolol tartrate (LOPRESSOR) 25 MG tablet Take 25 mg by mouth 2 (two) times daily.      simvastatin (ZOCOR) 10 MG tablet TAKE 1 TABLET EVERY EVENING 90 tablet 0    sotalol (BETAPACE) 80 MG tablet Take 0.5 tablets (40 mg total) by mouth 2 (two) times daily. 30 tablet 1    temazepam (RESTORIL) 15 mg Cap Take 15 mg by mouth nightly.  5    tramadol (ULTRAM) 50 mg tablet Take 50 mg by mouth every 8 (eight) hours as needed.   5    vitamin D (VITAMIN D3) 1000 units Tab Take 185 mg by mouth once daily.      walker Misc Use daily as directed      meclizine (ANTIVERT) 25 mg tablet Take 25 mg by mouth 3 (three) times daily as needed.      vitamins-lipotropics (LIPO-FLAVONOID PLUS) 200-100 mg Tab Take by mouth daily as needed.       No current facility-administered medications on file prior to visit.        Medications have been reviewed and reconciled with patient at this visit.  Barriers to medications present (no)    Adverse reactions to current medications (no)    Over the counter medications reviewed (Yes ), and if needed added to active Medication list at this visit.     Exam:  Wt Readings from Last 3 Encounters:   08/16/17 83 kg (183 lb)   05/08/17 80.2 kg (176 lb 12.9 oz)   11/05/14 68.9 kg (152 lb)     Temp Readings from Last 3 Encounters:   08/16/17 98.8 °F (37.1 °C) (Tympanic)   05/08/17 99.6 °F (37.6 °C) (Tympanic)   11/05/14 98.4 °F (36.9 °C) (Oral)     BP Readings from Last 3 Encounters:   05/08/17 136/78   11/05/14 (!) 113/56     Pulse Readings from Last 3 Encounters:   08/16/17 74   05/08/17 79   11/05/14 71     Body mass index is 32.43 kg/m².      Review of Systems   Constitutional: Negative.  Negative for chills and fever.   HENT: Negative.    Eyes: Negative.    Respiratory: Negative for cough and sputum production.    Cardiovascular: Negative.  Negative for chest pain, palpitations and claudication.   Gastrointestinal: Negative for heartburn, nausea and vomiting.   Genitourinary: Negative.    Musculoskeletal: Positive for joint pain.   Skin: Negative.    Neurological: Negative for dizziness, tingling and headaches.   Endo/Heme/Allergies: Negative for polydipsia. Does not bruise/bleed easily.   Psychiatric/Behavioral: Negative.  Negative for depression.       Physical Exam   Constitutional: She is oriented to person, place, and time. She appears well-developed and well-nourished.   HENT:   Head: Normocephalic and  atraumatic.   Nose: Nose normal.   Mouth/Throat: Oropharynx is clear and moist.   Eyes: EOM are normal. Pupils are equal, round, and reactive to light.   Neck: Normal range of motion. Neck supple. No thyromegaly present.   Cardiovascular: Normal rate and regular rhythm.    Murmur heard.  Pulmonary/Chest: Effort normal and breath sounds normal. No respiratory distress. She has no wheezes.   Abdominal: Soft. Bowel sounds are normal.   Musculoskeletal: Normal range of motion.   Lymphadenopathy:     She has no cervical adenopathy.   Neurological: She is alert and oriented to person, place, and time.   Psychiatric: She has a normal mood and affect. Her behavior is normal. Judgment and thought content normal.   Vitals reviewed.      Laboratory Reviewed ({Yes)  Lab Results   Component Value Date    WBC 7.43 11/04/2014    HGB 12.3 11/04/2014    HCT 37.7 11/04/2014     11/04/2014    CHOL 147 11/04/2014    TRIG 340 (H) 11/04/2014    HDL 12 (L) 11/04/2014    ALT 17 05/08/2017    AST 27 05/08/2017     05/08/2017    K 3.7 05/08/2017    CL 99 05/08/2017    CREATININE 1.3 05/08/2017    BUN 24 05/08/2017    CO2 29 05/08/2017    TSH 1.949 05/08/2017    INR 3.4 (H) 11/05/2014       Assessment:  Diagnoses of Pulmonary hypertensive arterial disease, Hyperlipidemia, unspecified hyperlipidemia type, Restless leg, Cerebrovascular accident (CVA), unspecified mechanism, Pacemaker, Essential hypertension, Coronary artery disease involving native coronary artery of native heart without angina pectoris, Atherosclerosis of aorta, Aortic valve stenosis, unspecified etiology, Hypertensive renal disease, stage 1-4 or unspecified chronic kidney disease, CKD (chronic kidney disease) stage 4, GFR 15-29 ml/min, Unspecified vitamin D deficiency, Acquired hypothyroidism, and Leg pain, bilateral were pertinent to this visit.     Plan  Pulmonary hypertensive arterial disease  She has seen Cards,   Notes reviewed  On Lopressor and Sotalol  confirmed by cards, Lasix as well.  No bradycardia  Stable Exam.  Records requested  Will need Echo    Hyperlipidemia  Lab Results   Component Value Date    CHOL 147 11/04/2014     Lab Results   Component Value Date    HDL 12 (L) 11/04/2014     Lab Results   Component Value Date    LDLCALC 67.0 11/04/2014     Lab Results   Component Value Date    TRIG 340 (H) 11/04/2014     Lab Results   Component Value Date    CHOLHDL 8.2 (L) 11/04/2014   Continue Zocor for risk reduction        Restless leg  Chronic Use of Temazepam  Has been on for long time  Would be hard to wean, discussed this in her advanced age.  Will continue med use per patient preference.    Cerebrovascular accident (CVA)  No change in status  No new focal changes  On Plavix and ASA and monitoring for now    Pacemaker  Followed by Cards  Report reviewed    Essential hypertension  Bp in goal range  Reviewed recent cards notes  Confirmed use of Lopressor and Sotalol for BP control.    Coronary artery disease involving native coronary artery without angina pectoris  No CP reports  On ASA and Plavix, Beta Blocker      Atherosclerosis of aorta  Reported on Echo in 2014  No other echo in Care Everywhere  Cards appt end of month  Last echo shows EF 55-60% in 2014, Diastolic Dysfunction  Aortic Valve Regurgitation and Aortic valve stenosis.       Aortic stenosis  Reported on Echo in 2014  No other echo in Care Everywhere  Cards appt end of month  Last echo shows EF 55-60% in 2014, Diastolic Dysfunction  Aortic Valve Regurgitation and Aortic valve stenosis.       Hypertensive renal disease  Creatinine Monitored  Not on ACE currently  Records from Dr. Mary on vascular studies done previously    CKD (chronic kidney disease) stage 4, GFR 15-29 ml/min  BMP  Lab Results   Component Value Date     05/08/2017    K 3.7 05/08/2017    CL 99 05/08/2017    CO2 29 05/08/2017    BUN 24 05/08/2017    CREATININE 1.3 05/08/2017    CALCIUM 10.3 05/08/2017    ANIONGAP 10  05/08/2017    ESTGFRAFRICA 41.4 (A) 05/08/2017    EGFRNONAA 36.0 (A) 05/08/2017   Stable  Last Labs in Jan also reviewed with Dr. Roberts  Creatinine 1.19 at that time.  Monitor every 3-6 months due to being on chronic diuretics.      Unspecified vitamin D deficiency  Check VIt D in May  Normal range  Will advise daily oral OTC supplement Caltrate D        Acquired hypothyroidism  Lab Results   Component Value Date    TSH 1.949 05/08/2017     Euthyroid on Labs  Continue Synthroid 75 mcg      Leg pain, bilateral  Chronic  Ultram for pain prn      She requests 6 month follow up. She would like to call when she is ready for appt.  She will get flu vaccine in October.    Care Plan/Goals: Reviewed  (Yes)  Goals     None          Follow up: No Follow-up on file.    After visit summary was printed and given to patient upon discharge today.  Patient goals and care plan are included in After Visit Summary.

## 2017-08-15 NOTE — ASSESSMENT & PLAN NOTE
Lab Results   Component Value Date    CHOL 147 11/04/2014     Lab Results   Component Value Date    HDL 12 (L) 11/04/2014     Lab Results   Component Value Date    LDLCALC 67.0 11/04/2014     Lab Results   Component Value Date    TRIG 340 (H) 11/04/2014     Lab Results   Component Value Date    CHOLHDL 8.2 (L) 11/04/2014   Continue Zocor for risk reduction

## 2017-08-16 ENCOUNTER — OFFICE VISIT (OUTPATIENT)
Dept: INTERNAL MEDICINE | Facility: CLINIC | Age: 82
End: 2017-08-16
Payer: MEDICARE

## 2017-08-16 VITALS
BODY MASS INDEX: 32.43 KG/M2 | HEART RATE: 74 BPM | DIASTOLIC BLOOD PRESSURE: 60 MMHG | OXYGEN SATURATION: 97 % | WEIGHT: 183 LBS | SYSTOLIC BLOOD PRESSURE: 136 MMHG | HEIGHT: 63 IN | TEMPERATURE: 99 F

## 2017-08-16 DIAGNOSIS — I25.10 CORONARY ARTERY DISEASE INVOLVING NATIVE CORONARY ARTERY OF NATIVE HEART WITHOUT ANGINA PECTORIS: ICD-10-CM

## 2017-08-16 DIAGNOSIS — I70.0 ATHEROSCLEROSIS OF AORTA: ICD-10-CM

## 2017-08-16 DIAGNOSIS — I63.9 CEREBROVASCULAR ACCIDENT (CVA), UNSPECIFIED MECHANISM: ICD-10-CM

## 2017-08-16 DIAGNOSIS — M79.604 LEG PAIN, BILATERAL: ICD-10-CM

## 2017-08-16 DIAGNOSIS — E78.5 HYPERLIPIDEMIA, UNSPECIFIED HYPERLIPIDEMIA TYPE: ICD-10-CM

## 2017-08-16 DIAGNOSIS — E03.9 ACQUIRED HYPOTHYROIDISM: ICD-10-CM

## 2017-08-16 DIAGNOSIS — G25.81 RESTLESS LEG: ICD-10-CM

## 2017-08-16 DIAGNOSIS — M79.605 LEG PAIN, BILATERAL: ICD-10-CM

## 2017-08-16 DIAGNOSIS — E55.9 UNSPECIFIED VITAMIN D DEFICIENCY: ICD-10-CM

## 2017-08-16 DIAGNOSIS — N18.4 CKD (CHRONIC KIDNEY DISEASE) STAGE 4, GFR 15-29 ML/MIN: ICD-10-CM

## 2017-08-16 DIAGNOSIS — I35.0 AORTIC VALVE STENOSIS, UNSPECIFIED ETIOLOGY: ICD-10-CM

## 2017-08-16 DIAGNOSIS — I27.21 PULMONARY HYPERTENSIVE ARTERIAL DISEASE: ICD-10-CM

## 2017-08-16 DIAGNOSIS — I10 ESSENTIAL HYPERTENSION: ICD-10-CM

## 2017-08-16 DIAGNOSIS — I12.9 HYPERTENSIVE RENAL DISEASE, STAGE 1-4 OR UNSPECIFIED CHRONIC KIDNEY DISEASE: ICD-10-CM

## 2017-08-16 DIAGNOSIS — Z95.0 PACEMAKER: ICD-10-CM

## 2017-08-16 PROCEDURE — 99499 UNLISTED E&M SERVICE: CPT | Mod: S$GLB,,, | Performed by: FAMILY MEDICINE

## 2017-08-16 PROCEDURE — 99999 PR PBB SHADOW E&M-EST. PATIENT-LVL III: CPT | Mod: PBBFAC,,, | Performed by: FAMILY MEDICINE

## 2017-08-16 PROCEDURE — 99214 OFFICE O/P EST MOD 30 MIN: CPT | Mod: S$GLB,,, | Performed by: FAMILY MEDICINE

## 2017-08-16 PROCEDURE — 3008F BODY MASS INDEX DOCD: CPT | Mod: S$GLB,,, | Performed by: FAMILY MEDICINE

## 2017-08-16 PROCEDURE — 1159F MED LIST DOCD IN RCRD: CPT | Mod: S$GLB,,, | Performed by: FAMILY MEDICINE

## 2017-08-16 PROCEDURE — 1125F AMNT PAIN NOTED PAIN PRSNT: CPT | Mod: S$GLB,,, | Performed by: FAMILY MEDICINE

## 2017-08-16 NOTE — ASSESSMENT & PLAN NOTE
Bp in goal range  Reviewed recent cards notes  Confirmed use of Lopressor and Sotalol for BP control.

## 2017-08-16 NOTE — ASSESSMENT & PLAN NOTE
BMP  Lab Results   Component Value Date     05/08/2017    K 3.7 05/08/2017    CL 99 05/08/2017    CO2 29 05/08/2017    BUN 24 05/08/2017    CREATININE 1.3 05/08/2017    CALCIUM 10.3 05/08/2017    ANIONGAP 10 05/08/2017    ESTGFRAFRICA 41.4 (A) 05/08/2017    EGFRNONAA 36.0 (A) 05/08/2017   Stable  Last Labs in Jan also reviewed with Dr. Roberts  Creatinine 1.19 at that time.  Monitor every 3-6 months due to being on chronic diuretics.

## 2017-08-16 NOTE — ASSESSMENT & PLAN NOTE
Chronic Use of Temazepam  Has been on for long time  Would be hard to wean, discussed this in her advanced age.  Will continue med use per patient preference.

## 2017-08-16 NOTE — PATIENT INSTRUCTIONS

## 2017-08-16 NOTE — ASSESSMENT & PLAN NOTE
Lab Results   Component Value Date    TSH 1.949 05/08/2017     Euthyroid on Labs  Continue Synthroid 75 mcg

## 2017-08-16 NOTE — ASSESSMENT & PLAN NOTE
Creatinine Monitored  Not on ACE currently  Records from Dr. Mary on vascular studies done previously

## 2017-08-31 RX ORDER — CLOPIDOGREL BISULFATE 75 MG/1
TABLET ORAL
Qty: 90 TABLET | Refills: 0 | Status: SHIPPED | OUTPATIENT
Start: 2017-08-31 | End: 2017-11-02 | Stop reason: SDUPTHER

## 2017-08-31 RX ORDER — FUROSEMIDE 40 MG/1
TABLET ORAL
Qty: 90 TABLET | Refills: 0 | Status: SHIPPED | OUTPATIENT
Start: 2017-08-31 | End: 2017-11-02 | Stop reason: SDUPTHER

## 2017-08-31 RX ORDER — SIMVASTATIN 10 MG/1
TABLET, FILM COATED ORAL
Qty: 90 TABLET | Refills: 0 | Status: SHIPPED | OUTPATIENT
Start: 2017-08-31 | End: 2017-11-02 | Stop reason: SDUPTHER

## 2017-08-31 RX ORDER — LEVOTHYROXINE SODIUM 75 UG/1
TABLET ORAL
Qty: 90 TABLET | Refills: 0 | Status: SHIPPED | OUTPATIENT
Start: 2017-08-31 | End: 2017-11-02 | Stop reason: SDUPTHER

## 2017-09-25 RX ORDER — SOTALOL HYDROCHLORIDE 80 MG/1
TABLET ORAL
Qty: 30 TABLET | Refills: 1 | Status: SHIPPED | OUTPATIENT
Start: 2017-09-25

## 2017-11-01 ENCOUNTER — TELEPHONE (OUTPATIENT)
Dept: INTERNAL MEDICINE | Facility: CLINIC | Age: 82
End: 2017-11-01

## 2017-11-01 NOTE — TELEPHONE ENCOUNTER
Spoke with pt, she says that she already got her refills on her tramadol and sotalol  Says that she phoned last week for refills  I notified pt we did not receive any refill request  I notified pt if she has any problems with the pharmacy to call our office for refills  Pt verbalized understanding

## 2017-11-01 NOTE — TELEPHONE ENCOUNTER
----- Message from Laurie Mendez sent at 11/1/2017  1:40 PM CDT -----  Contact: patient  Calling in reference to her medications. Please call patient @ today ASAP URGENT!! [phone]. Thanks, luzma

## 2017-11-03 RX ORDER — CLOPIDOGREL BISULFATE 75 MG/1
TABLET ORAL
Qty: 90 TABLET | Refills: 0 | Status: SHIPPED | OUTPATIENT
Start: 2017-11-03 | End: 2018-01-24 | Stop reason: SDUPTHER

## 2017-11-03 RX ORDER — LEVOTHYROXINE SODIUM 75 UG/1
TABLET ORAL
Qty: 90 TABLET | Refills: 0 | Status: SHIPPED | OUTPATIENT
Start: 2017-11-03 | End: 2018-06-13 | Stop reason: SDUPTHER

## 2017-11-03 RX ORDER — FUROSEMIDE 40 MG/1
TABLET ORAL
Qty: 90 TABLET | Refills: 0 | Status: SHIPPED | OUTPATIENT
Start: 2017-11-03 | End: 2018-06-27

## 2017-11-03 RX ORDER — SIMVASTATIN 10 MG/1
TABLET, FILM COATED ORAL
Qty: 90 TABLET | Refills: 0 | Status: SHIPPED | OUTPATIENT
Start: 2017-11-03 | End: 2018-01-24 | Stop reason: SDUPTHER

## 2017-12-15 ENCOUNTER — DOCUMENTATION ONLY (OUTPATIENT)
Dept: INTERNAL MEDICINE | Facility: CLINIC | Age: 82
End: 2017-12-15

## 2018-01-15 ENCOUNTER — TELEPHONE (OUTPATIENT)
Dept: INTERNAL MEDICINE | Facility: CLINIC | Age: 83
End: 2018-01-15

## 2018-01-15 NOTE — TELEPHONE ENCOUNTER
----- Message from Elba Mahoneyite sent at 1/12/2018  4:12 PM CST -----  Contact: Pt   Pt called and stated she needed to speak to the nurse. She stated she needs to ask a question about her vitamin D. She can be reached at [phone].    Thanks,  TF

## 2018-01-15 NOTE — TELEPHONE ENCOUNTER
Spoke with pt, she wanted to know how many mg of vitamin D she is taking  I notified her 1000 units daily, pt says that she will have son buy otc  Voiced understanding

## 2018-01-24 RX ORDER — SIMVASTATIN 10 MG/1
TABLET, FILM COATED ORAL
Qty: 90 TABLET | Refills: 0 | Status: SHIPPED | OUTPATIENT
Start: 2018-01-24

## 2018-01-24 RX ORDER — CLOPIDOGREL BISULFATE 75 MG/1
TABLET ORAL
Qty: 90 TABLET | Refills: 0 | Status: SHIPPED | OUTPATIENT
Start: 2018-01-24

## 2018-02-14 ENCOUNTER — PATIENT OUTREACH (OUTPATIENT)
Dept: ADMINISTRATIVE | Facility: HOSPITAL | Age: 83
End: 2018-02-14

## 2018-02-19 RX ORDER — TRAMADOL HYDROCHLORIDE 50 MG/1
50 TABLET ORAL EVERY 12 HOURS PRN
Qty: 30 TABLET | Refills: 1 | Status: SHIPPED | OUTPATIENT
Start: 2018-02-19 | End: 2018-07-22 | Stop reason: SDUPTHER

## 2018-02-23 PROBLEM — I51.89 DIASTOLIC DYSFUNCTION: Status: ACTIVE | Noted: 2018-02-23

## 2018-02-23 PROBLEM — I65.29 CAROTID ATHEROSCLEROSIS: Status: ACTIVE | Noted: 2018-02-23

## 2018-06-13 RX ORDER — LEVOTHYROXINE SODIUM 75 UG/1
TABLET ORAL
Qty: 90 TABLET | Refills: 0 | Status: SHIPPED | OUTPATIENT
Start: 2018-06-13 | End: 2018-06-27 | Stop reason: SDUPTHER

## 2018-06-13 RX ORDER — LEVOTHYROXINE SODIUM 75 UG/1
75 TABLET ORAL DAILY
Qty: 30 TABLET | Refills: 1 | Status: SHIPPED | OUTPATIENT
Start: 2018-06-13 | End: 2018-06-13 | Stop reason: SDUPTHER

## 2018-06-13 NOTE — TELEPHONE ENCOUNTER
----- Message from Taniya Toussaint sent at 6/13/2018  2:46 PM CDT -----  Contact: gordo/[facility] pharm [phone]  States that pt has been out of her levothyroxine 75 mcg  for 4 days due to mess up with mail order pharm. Pt is requesting a rx be sent to     [facility] [id number] [facility] [employer] [facility] [address] - [address]  [address] [address]  Phone: [phone] Fax: [phone]    Please call back at [phone]//thank you acc

## 2018-06-27 RX ORDER — LEVOTHYROXINE SODIUM 75 UG/1
75 TABLET ORAL DAILY
Qty: 90 TABLET | Refills: 0 | Status: SHIPPED | OUTPATIENT
Start: 2018-06-27 | End: 2019-01-11 | Stop reason: SDUPTHER

## 2018-06-27 RX ORDER — BUMETANIDE 2 MG/1
2 TABLET ORAL DAILY
COMMUNITY

## 2018-06-27 RX ORDER — RAMIPRIL 5 MG/1
5 CAPSULE ORAL DAILY
COMMUNITY

## 2018-06-27 NOTE — TELEPHONE ENCOUNTER
I sent on 6.13.  Did she call [facility]?  She is due for TSH.  Does she want 30 day sent to a local pharmacy?

## 2018-06-27 NOTE — TELEPHONE ENCOUNTER
Patient is requesting a new rx for levothyroxine she has not received her mail order and has been out for four day.

## 2018-07-22 RX ORDER — TRAMADOL HYDROCHLORIDE 50 MG/1
50 TABLET ORAL EVERY 12 HOURS PRN
Qty: 30 TABLET | Refills: 1 | Status: SHIPPED | OUTPATIENT
Start: 2018-07-22

## 2018-10-19 ENCOUNTER — TELEPHONE (OUTPATIENT)
Dept: INTERNAL MEDICINE | Facility: CLINIC | Age: 83
End: 2018-10-19

## 2018-10-19 RX ORDER — LEVOTHYROXINE SODIUM 75 UG/1
TABLET ORAL
Qty: 90 TABLET | Refills: 0 | OUTPATIENT
Start: 2018-10-19

## 2018-10-19 NOTE — TELEPHONE ENCOUNTER
----- Message from Chucky Ramirez sent at 10/19/2018  2:48 PM CDT -----  Contact: pt  She's calling in regards to a missed call, [phone] (home)

## 2018-10-19 NOTE — TELEPHONE ENCOUNTER
Patient did get her refill.  She is due for her medication filled  She declined to schedule annual at this time.  I told her to call back and schedule.

## 2018-11-27 ENCOUNTER — HOSPITAL ENCOUNTER (EMERGENCY)
Facility: HOSPITAL | Age: 83
Discharge: HOME | End: 2018-11-27
Attending: EMERGENCY MEDICINE
Payer: MEDICARE

## 2018-11-27 VITALS
RESPIRATION RATE: 20 BRPM | HEART RATE: 78 BPM | BODY MASS INDEX: 25.97 KG/M2 | OXYGEN SATURATION: 99 % | HEIGHT: 64 IN | WEIGHT: 152.13 LBS | TEMPERATURE: 99 F | SYSTOLIC BLOOD PRESSURE: 164 MMHG | DIASTOLIC BLOOD PRESSURE: 83 MMHG

## 2018-11-27 DIAGNOSIS — S51.012A SKIN TEAR OF ELBOW WITHOUT COMPLICATION, LEFT, INITIAL ENCOUNTER: ICD-10-CM

## 2018-11-27 DIAGNOSIS — F41.9 ANXIETY: ICD-10-CM
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
## 2018-11-27 PROCEDURE — 99283 EMERGENCY DEPT VISIT LOW MDM: CPT | Mod: HCNC

## 2018-11-27 PROCEDURE — 25000003 PHARM REV CODE 250: Mod: HCNC | Performed by: EMERGENCY MEDICINE

## 2018-11-27 RX ORDER — TRAMADOL HYDROCHLORIDE 50 MG/1
50 TABLET ORAL
Status: COMPLETED | OUTPATIENT
Start: 2018-11-27 | End: 2018-11-27

## 2018-11-27 RX ADMIN — TRAMADOL HYDROCHLORIDE 50 MG: 50 TABLET, COATED ORAL at 08:11

## 2018-11-27 RX ADMIN — BACITRACIN, NEOMYCIN, POLYMYXIN B 1 EACH: 400; 3.5; 5 OINTMENT TOPICAL at 08:11

## 2018-11-28 NOTE — ED NOTES
Applied non adhesive (Tefla) bandages and triple antibiotic ointment to pt. Skin tears.  Gave dressing change instructions to pt. and grandson.

## 2018-11-28 NOTE — ED PROVIDER NOTES
SCRIBE #1 NOTE: I, Tamy Brown, am scribing for, and in the presence of, Yu Barrera MD. I have scribed the entire note.       History     Chief Complaint   Patient presents with    Fall     ground level fall from chair to floor, skin tears to LUE and right foot; denies LOC     Review of patient's allergies indicates:   Allergen Reactions    Cortisone Hives    Cephalosporins     Codeine     Corticosteroids (glucocorticoids)     Penicillins          History of Present Illness     HPI    11/27/2018, 8:20 PM  History obtained from the patient      History of Present Illness: Sherly Franco is a 89 y.o. female patient who presents to the Emergency Department for evaluation following a fall which occurred earlier today. Pt states she was sitting in a chair, slid out of the chair and then hit the floor. Symptoms are episodic and moderate in severity. No mitigating or exacerbating factors reported. Pt is c/o LUE wound. She states she hit her L arm on a cabinet when she was falling. Associated sxs include abrasions to RLE and LUE pain. Patient denies any fever, chills, LOC, head trauma, HA, ankle pain, neck pain, back pain, hip pain, and all other sxs at this time. No further complaints or concerns at this time.       Arrival mode: Personal vehicle      PCP: Racquel Cowan MD        Past Medical History:  Past Medical History:   Diagnosis Date    A-fib     s/p pacemaker in 09/14    Coronary artery disease     s/p PCI in 2014    High cholesterol     Hypertension     Stroke     Thyroid disease        Past Surgical History:  Past Surgical History:   Procedure Laterality Date    APPENDECTOMY      BACK SURGERY      BREAST SURGERY      CARDIAC PACEMAKER PLACEMENT      CARDIAC PACEMAKER PLACEMENT      CHOLECYSTECTOMY      pace      TONSILLECTOMY           Family History:  Family History   Problem Relation Age of Onset    Cancer Mother     Cancer Father     Cancer Sister        Social  History:  Social History     Tobacco Use    Smoking status: Former Smoker    Smokeless tobacco: Former User   Substance and Sexual Activity    Alcohol use: Yes     Comment: Christine    Drug use: No    Sexual activity: Unknown        Review of Systems     Review of Systems   Constitutional: Negative for chills, diaphoresis and fever.   HENT: Negative for congestion, rhinorrhea and sore throat.         (-) head trauma   Respiratory: Negative for cough and shortness of breath.    Cardiovascular: Negative for chest pain.   Gastrointestinal: Negative for abdominal pain, diarrhea, nausea and vomiting.   Genitourinary: Negative for dysuria, frequency and hematuria.   Musculoskeletal: Positive for myalgias (LUE pain). Negative for arthralgias (Ankle or hip), back pain and neck pain.   Skin: Positive for wound (LUE skin tear & RLE abrasion). Negative for rash.   Neurological: Negative for dizziness, syncope, weakness, numbness and headaches.   Hematological: Does not bruise/bleed easily.   All other systems reviewed and are negative.       Physical Exam     Initial Vitals [11/27/18 1809]   BP Pulse Resp Temp SpO2   138/66 73 20 98.6 °F (37 °C) 99 %      MAP       --          Physical Exam  Nursing Notes and Vital Signs Reviewed.  Constitutional: Patient is in no acute distress. Awake and alert. Appropriate for age.   Head: Atraumatic. No facial instability or step-offs.   Eyes: PERRL. EOM normal. Conjunctivae normal.   HENT: Moist mucous membranes. No epistaxis. Patent airway.   Neck: No midline bony tenderness, deformities, or step-offs   Cardiovascular: Regular rate and rhythm. Heart sounds are normal. Intact distal pulses   Pulmonary/Chest: No respiratory distress. Breath sounds are normal. No decreased breath sounds. Chest wall is stable.   Abdominal: Soft and non-distended. Non-tender.   Back: No abrasions or ecchymosis. No midline bony tenderness to the T-spine or L-spine. No deformities or step-offs.  "  Musculoskeletal: Full range of motion in bilateral extremities. No obvious deformities. Pelvis is stable.  LUE: Large skin tear to L mid forearm. Bleeding is well controlled. No bony deformity. ROM is normal. Cap refill distally is <2 seconds. Radial pulses are equal and 2+ bilaterally. No motor deficit. No distal sensory deficit.  Skin: Normal color. No cyanosis.   Neurological: Awake and alert. Appropriate for age. GCS 15. Normal speech. Motor strength is normal at 5/5 bilaterally. Non-focal neurological examination.     ED Course   Procedures  ED Vital Signs:  Vitals:    11/27/18 1809 11/27/18 2021   BP: 138/66 (!) 166/74   Pulse: 73 76   Resp: 20 20   Temp: 98.6 °F (37 °C)    TempSrc: Oral    SpO2: 99% 100%   Weight: 69 kg (152 lb 1.9 oz)    Height: 5' 4" (1.626 m)                 The Emergency Provider reviewed the vital signs and test results, which are outlined above.     ED Discussion     8:44 PM: Reassessed pt at this time. Patient is awake, alert, and in NAD. Grandson is at the bedside. Pt states her condition has improved at this time. Discussed with pt all pertinent ED information and results. Discussed pt dx and plan of tx. Gave pt all f/u and return to the ED instructions. All questions and concerns were addressed at this time. Pt expresses understanding of information and instructions, and is comfortable with plan to discharge. Pt is stable for discharge.    Trauma precautions were discussed with patient and/or family/caretaker; I do not specifically detect any abdominal, thoracic, CNS, orthopedic, or other emergent or life threatening condition and that patient is safe to be discharged.  It was also discussed that despite an unrevealing examination and negative radiographic examination for serious or life threatening injury, these conditions may still exist.  As such, patient should return to ED immediately should they experience, severe or worsening pain, shortness of breath, abdominal pain, " headache, vomiting, or any other concern.  It was also discussed that not infrequently, injuries may not be diagnosed during the initial ED visit (such as fractures) and that if the patient discovers a new area of concern, a new area of injury that was not evaluated in the ED, they should return for evaluation as they may have an injury that requires treatment.    ED Medication(s):  Medications   neomycin-bacitracnZn-polymyxnB packet 1 each (not administered)   traMADol tablet 50 mg (not administered)       Follow-up Information     Racquel Cowan MD. Schedule an appointment as soon as possible for a visit in 2 days.    Specialty:  Family Medicine  Why:  Return to the Emergency Room, If symptoms worsen  Contact information:  [address] [facility] [address] [phone]                                     Scribe Attestation:   Scribe #1: I performed the above scribed service and the documentation accurately describes the services I performed. I attest to the accuracy of the note.     Attending:   Physician Attestation Statement for Scribe #1: I, Yu Barrera MD, personally performed the services described in this documentation, as scribed by Tamy Brown, in my presence, and it is both accurate and complete.           Clinical Impression       ICD-10-CM ICD-9-CM   1. Fall at home, initial encounter W19.XXXA E888.9    Y92.009 E849.0   2. Skin tear of elbow without complication, left, initial encounter S51.012A 881.01       Disposition:   Disposition: Discharged  Condition: Stable         Yu Barrera MD  11/29/18 0936

## 2018-12-03 ENCOUNTER — OFFICE VISIT (OUTPATIENT)
Dept: INTERNAL MEDICINE | Facility: CLINIC | Age: 83
End: 2018-12-03
Payer: MEDICARE

## 2018-12-03 VITALS
BODY MASS INDEX: 25.63 KG/M2 | HEIGHT: 64 IN | HEART RATE: 69 BPM | TEMPERATURE: 99 F | OXYGEN SATURATION: 99 % | WEIGHT: 150.13 LBS

## 2018-12-03 DIAGNOSIS — I35.9 AORTIC VALVE DISEASE: ICD-10-CM

## 2018-12-03 DIAGNOSIS — M79.604 LEG PAIN, BILATERAL: ICD-10-CM

## 2018-12-03 DIAGNOSIS — Z85.828 HISTORY OF SKIN CANCER: ICD-10-CM

## 2018-12-03 DIAGNOSIS — R26.89 DECREASED MOBILITY: ICD-10-CM

## 2018-12-03 DIAGNOSIS — I10 ESSENTIAL HYPERTENSION: ICD-10-CM

## 2018-12-03 DIAGNOSIS — R29.6 RECURRENT FALLS: Primary | ICD-10-CM

## 2018-12-03 DIAGNOSIS — I27.21 PULMONARY ARTERY HYPERTENSION: ICD-10-CM

## 2018-12-03 DIAGNOSIS — Z86.73 HISTORY OF CVA (CEREBROVASCULAR ACCIDENT): ICD-10-CM

## 2018-12-03 DIAGNOSIS — E03.9 ACQUIRED HYPOTHYROIDISM: ICD-10-CM

## 2018-12-03 DIAGNOSIS — S51.012A SKIN TEAR OF LEFT ELBOW WITHOUT COMPLICATION, INITIAL ENCOUNTER: ICD-10-CM

## 2018-12-03 DIAGNOSIS — I70.0 CALCIFICATION OF AORTA: ICD-10-CM

## 2018-12-03 DIAGNOSIS — I51.89 DIASTOLIC DYSFUNCTION: ICD-10-CM

## 2018-12-03 DIAGNOSIS — N18.4 CKD (CHRONIC KIDNEY DISEASE) STAGE 4, GFR 15-29 ML/MIN: ICD-10-CM

## 2018-12-03 DIAGNOSIS — E78.5 HYPERLIPIDEMIA, UNSPECIFIED HYPERLIPIDEMIA TYPE: ICD-10-CM

## 2018-12-03 DIAGNOSIS — I65.29 CAROTID ATHEROSCLEROSIS, UNSPECIFIED LATERALITY: ICD-10-CM

## 2018-12-03 DIAGNOSIS — I25.10 CORONARY ARTERY DISEASE INVOLVING NATIVE CORONARY ARTERY OF NATIVE HEART WITHOUT ANGINA PECTORIS: ICD-10-CM

## 2018-12-03 DIAGNOSIS — I35.0 AORTIC VALVE STENOSIS, ETIOLOGY OF CARDIAC VALVE DISEASE UNSPECIFIED: ICD-10-CM

## 2018-12-03 DIAGNOSIS — M79.605 LEG PAIN, BILATERAL: ICD-10-CM

## 2018-12-03 DIAGNOSIS — Z95.0 PACEMAKER: ICD-10-CM

## 2018-12-03 DIAGNOSIS — I12.9 HYPERTENSIVE RENAL DISEASE: ICD-10-CM

## 2018-12-03 PROBLEM — Z79.899 ENCOUNTER FOR LONG-TERM (CURRENT) USE OF MEDICATIONS: Status: ACTIVE | Noted: 2017-05-08

## 2018-12-03 PROCEDURE — 99214 OFFICE O/P EST MOD 30 MIN: CPT | Mod: HCNC,25,S$GLB, | Performed by: FAMILY MEDICINE

## 2018-12-03 PROCEDURE — G0008 ADMIN INFLUENZA VIRUS VAC: HCPCS | Mod: HCNC,S$GLB,, | Performed by: FAMILY MEDICINE

## 2018-12-03 PROCEDURE — 90662 IIV NO PRSV INCREASED AG IM: CPT | Mod: HCNC,S$GLB,, | Performed by: FAMILY MEDICINE

## 2018-12-03 PROCEDURE — 99499 UNLISTED E&M SERVICE: CPT | Mod: S$GLB,,, | Performed by: FAMILY MEDICINE

## 2018-12-03 PROCEDURE — 99999 PR PBB SHADOW E&M-EST. PATIENT-LVL V: CPT | Mod: PBBFAC,HCNC,, | Performed by: FAMILY MEDICINE

## 2018-12-03 RX ORDER — SPIRONOLACTONE 50 MG/1
50 TABLET, FILM COATED ORAL
COMMUNITY
Start: 2013-07-07

## 2018-12-03 RX ORDER — SILVER SULFADIAZINE 10 G/1000G
CREAM TOPICAL
COMMUNITY
Start: 2018-12-02

## 2018-12-03 RX ORDER — AZITHROMYCIN 250 MG/1
TABLET, FILM COATED ORAL
Refills: 0 | COMMUNITY
Start: 2018-11-20

## 2018-12-03 NOTE — PROGRESS NOTES
Sherly Franco  12/03/2018  [id number]    Racquel Cowan MD  Patient Care Team:  Racquel Cowan MD as PCP - General (Family Medicine)  MARGARET Mccord (Inactive) as   Marita Bowen LPN as Care Coordinator (Internal Medicine)  Has the patient seen any provider outside of the [facility] network since the last visit? (yes). If yes, HIPPA forms completed and records requested.        Visit Type:Er follow up    Chief Complaint:  Chief Complaint   Patient presents with    Follow-up     er follow up. patient said she needs help at home with trying to get things done.       History of Present Illness:  Patient here for ER follow up.  I last saw patient over 1 year ago, in August 2017.  Chart review shows she has been on OP visit with Wound Care at [facility] since July.    She was in ER for a fall.Pt states she was sitting in a chair, slid out of the chair and then hit the floor. Symptoms are episodic and moderate in severity. No mitigating or exacerbating factors reported. Pt is c/o LUE wound. She states she hit her L arm on a cabinet when she was falling. Associated sxs include abrasions to RLE and LUE pain. Patient denies any fever, chills, LOC, head trauma, HA, ankle pain, neck pain, back pain, hip pain, and all other sxs at this time. No further complaints or concerns at this time.     Cards is Dr. Mary He is the one that sent her to wound care. She gives history of CAD, with a stent. Unknown where per patient. She had pacemaker placed for arrythmia, but does not know what kind.  She was admitted for dehydration and pre syncope in 2014.  Soon after that admission she had aortic valve replaced.  She reports only mild SOB with exertion.  She has mild pedal edema which is chronic. She is on Lasix. Un clear if still on Aldactone. Will need to review with Cards notes.      She has RLS and chronic leg pain. Dr. Roberts has given her Norco and Ultram for her pain.  She tries to elevate this when she  can.      She is still living independently. Tries not to drive if she doesn't have too.  Reports social issues with trying to get her services for seniors at home. She has worked with outpatient case management when at Dr. Roberts office.     She had old stroke, Documented back to 2012. No changes.         History:  Past Medical History:   Diagnosis Date    A-fib     s/p pacemaker in 09/14    Coronary artery disease     s/p PCI in 2014    High cholesterol     Hypertension     Stroke     Thyroid disease      Past Surgical History:   Procedure Laterality Date    APPENDECTOMY      BACK SURGERY      BREAST SURGERY      CARDIAC PACEMAKER PLACEMENT      CARDIAC PACEMAKER PLACEMENT      CHOLECYSTECTOMY      pace      TONSILLECTOMY       Family History   Problem Relation Age of Onset    Cancer Mother     Cancer Father     Cancer Sister      Social History     Socioeconomic History    Marital status:      Spouse name: Not on file    Number of children: 2    Years of education: Not on file    Highest education level: Not on file   Social Needs    Financial resource strain: Not on file    Food insecurity - worry: Not on file    Food insecurity - inability: Not on file    Transportation needs - medical: Not on file    Transportation needs - non-medical: Not on file   Occupational History    Not on file   Tobacco Use    Smoking status: Former Smoker    Smokeless tobacco: Former User   Substance and Sexual Activity    Alcohol use: Yes     Comment: Christine    Drug use: No    Sexual activity: Not on file   Other Topics Concern    Not on file   Social History Narrative    Not on file     Patient Active Problem List   Diagnosis    Aortic stenosis    CKD (chronic kidney disease) stage 4, GFR 15-29 ml/min    Essential hypertension    Coronary artery disease involving native coronary artery without angina pectoris    Pacemaker    Acquired hypothyroidism    Allergic rhinitis     Calcification of aorta    History of CVA (cerebrovascular accident)    History of skin cancer    HLD (hyperlipidemia)    Hypertensive renal disease    Pulmonary artery hypertension    RLS (restless legs syndrome)    Vitamin D deficiency    Encounter for long-term (current) use of medications    Leg pain, bilateral    Decreased mobility    Carotid atherosclerosis    Diastolic dysfunction    Aortic valve disease    Dermatitis     Review of patient's allergies indicates:   Allergen Reactions    Cortisone Hives    Cephalosporins     Codeine     Corticosteroids (glucocorticoids)     Penicillins        The following were reviewed at this visit: active problem list, medication list, allergies, family history, social history, and health maintenance.    Medications:  Current Outpatient Medications on File Prior to Visit   Medication Sig Dispense Refill    blood pressure monitor Kit       bumetanide (BUMEX) 2 MG tablet Take 2 mg by mouth once daily.      clopidogrel (PLAVIX) 75 mg tablet TAKE 1 TABLET EVERY DAY 90 tablet 0    docusate sodium (COLACE) 100 MG capsule Take 100 mg by mouth.      FLUZONE HIGH-DOSE 2017-18, PF, 180 mcg/0.5 mL vaccine TO BE ADMINISTERED BY PHARMACIST FOR IMMUNIZATION  0    levothyroxine (SYNTHROID) 75 MCG tablet Take 1 tablet (75 mcg total) by mouth once daily. 90 tablet 0    meclizine (ANTIVERT) 25 mg tablet Take 25 mg by mouth 3 (three) times daily as needed.      metoprolol tartrate (LOPRESSOR) 25 MG tablet Take 25 mg by mouth 2 (two) times daily.      ramipril (ALTACE) 5 MG capsule Take 5 mg by mouth once daily.      sacubitril-valsartan (ENTRESTO) 24-26 mg per tablet Take 1 tablet by mouth 2 (two) times daily.      simvastatin (ZOCOR) 10 MG tablet TAKE 1 TABLET EVERY EVENING 90 tablet 0    sotalol (BETAPACE) 80 MG tablet TAKE ONE-HALF TABLET BY MOUTH 2 TIMES DAILY. 30 tablet 1    temazepam (RESTORIL) 15 mg Cap Take 15 mg by mouth nightly.  5    traMADol (ULTRAM)  50 mg tablet TAKE 1 TABLET (50 MG TOTAL) BY MOUTH EVERY 12 (TWELVE) HOURS AS NEEDED. 30 tablet 1    vitamin D (VITAMIN D3) 1000 units Tab Take 185 mg by mouth once daily.      vitamins-lipotropics (LIPO-FLAVONOID PLUS) 200-100 mg Tab Take by mouth daily as needed.      walker Misc Use daily as directed      aspirin (ECOTRIN) 81 MG EC tablet Take 1 tablet (81 mg total) by mouth once daily.  0     No current facility-administered medications on file prior to visit.        Medications have been reviewed and reconciled with patient at this visit.  Barriers to medications present (yes)    Adverse reactions to current medications (no)    Over the counter medications reviewed (Yes ), and if needed added to active Medication list at this visit.     Exam:  Wt Readings from Last 3 Encounters:   12/03/18 68.1 kg (150 lb 2.1 oz)   11/27/18 69 kg (152 lb 1.9 oz)   08/16/17 83 kg (183 lb)     Temp Readings from Last 3 Encounters:   11/27/18 98.6 °F (37 °C) (Oral)   08/16/17 98.8 °F (37.1 °C) (Tympanic)   05/08/17 99.6 °F (37.6 °C) (Tympanic)     BP Readings from Last 3 Encounters:   11/27/18 (!) 164/83   08/16/17 136/60   05/08/17 136/78     Pulse Readings from Last 3 Encounters:   11/27/18 78   08/16/17 74   05/08/17 79     Body mass index is 25.77 kg/m².      Review of Systems   Constitutional: Negative.  Negative for chills and fever.   HENT: Negative.  Negative for congestion, sinus pain and sore throat.    Eyes: Negative for blurred vision and double vision.   Respiratory: Negative for cough, sputum production, shortness of breath and wheezing.    Cardiovascular: Negative for chest pain, palpitations and leg swelling.   Gastrointestinal: Negative for abdominal pain, constipation, diarrhea, heartburn, nausea and vomiting.   Genitourinary: Negative.    Musculoskeletal: Positive for falls.   Skin: Negative.  Negative for rash.        Abrasion/skin tear     Neurological: Negative.    Endo/Heme/Allergies: Negative.  Negative  for polydipsia. Does not bruise/bleed easily.   Psychiatric/Behavioral: Negative for depression and substance abuse.     Physical Exam   Constitutional: She is oriented to person, place, and time. She appears well-developed and well-nourished. No distress.   HENT:   Head: Normocephalic and atraumatic.   Right Ear: External ear normal.   Left Ear: External ear normal.   Nose: Nose normal.   Mouth/Throat: Oropharynx is clear and moist. No oropharyngeal exudate.   Eyes: Conjunctivae and EOM are normal. Pupils are equal, round, and reactive to light. Right eye exhibits no discharge. Left eye exhibits no discharge.   Neck: Normal range of motion. Neck supple. No thyromegaly present.   Cardiovascular: Normal rate, regular rhythm, normal heart sounds and intact distal pulses.   No murmur heard.  Pulmonary/Chest: Effort normal and breath sounds normal. No respiratory distress. She has no wheezes.   Abdominal: Soft. Bowel sounds are normal. She exhibits no distension and no mass. There is no tenderness.   Musculoskeletal: Normal range of motion. She exhibits no edema.   Lymphadenopathy:     She has no cervical adenopathy.   Neurological: She is alert and oriented to person, place, and time. No cranial nerve deficit.   Skin: Capillary refill takes less than 2 seconds. She is not diaphoretic.        Large skin tear on left elbow.  Wrapping removed.  She has been putting a burn cream, area remains macerated.    She has busing to entire arm, with gravity pulling into wrist     Psychiatric: She has a normal mood and affect. Her behavior is normal. Judgment and thought content normal.   Nursing note and vitals reviewed.      Laboratory Reviewed ({Yes)  Lab Results   Component Value Date    WBC 7.43 11/04/2014    HGB 12.3 11/04/2014    HCT 37.7 11/04/2014     11/04/2014    CHOL 213 (A) 01/25/2017    TRIG 342 01/25/2017    HDL 34 01/25/2017    ALT 17 05/08/2017    AST 27 05/08/2017     05/08/2017    K 3.7 05/08/2017     CL 99 05/08/2017    CREATININE 1.3 05/08/2017    BUN 24 05/08/2017    CO2 29 05/08/2017    TSH 1.949 05/08/2017    INR 3.4 (H) 11/05/2014       Orstella was seen today for follow-up.    Diagnoses and all orders for this visit:    Recurrent falls    Essential hypertension  -     Ambulatory referral to Home Health  -     Ambulatory referral to Outpatient Case Management    Coronary artery disease involving native coronary artery of native heart without angina pectoris  -     Ambulatory referral to Home Health  -     Ambulatory referral to Outpatient Case Management    Pacemaker  -     Ambulatory referral to Home Health  -     Ambulatory referral to Outpatient Case Management    Acquired hypothyroidism  -     Ambulatory referral to Home Health  -     Ambulatory referral to Outpatient Case Management    History of CVA (cerebrovascular accident)  -     Ambulatory referral to Home Health  -     Ambulatory referral to Outpatient Case Management    Hyperlipidemia, unspecified hyperlipidemia type  -     Ambulatory referral to Home Health  -     Ambulatory referral to Outpatient Case Management    Hypertensive renal disease  -     Ambulatory referral to Home Health  -     Ambulatory referral to Outpatient Case Management    Leg pain, bilateral  -     Ambulatory referral to Home Health  -     Ambulatory referral to Outpatient Case Management    Decreased mobility  -     Ambulatory referral to Home Health  -     Ambulatory referral to Outpatient Case Management    Carotid atherosclerosis, unspecified laterality  -     Ambulatory referral to Home Health  -     Ambulatory referral to Outpatient Case Management    Diastolic dysfunction  -     Ambulatory referral to Home Health  -     Ambulatory referral to Outpatient Case Management    Aortic valve disease  -     Ambulatory referral to Home Health  -     Ambulatory referral to Outpatient Case Management    Aortic valve stenosis, etiology of cardiac valve disease unspecified  -      Ambulatory referral to Home Health  -     Ambulatory referral to Outpatient Case Management    CKD (chronic kidney disease) stage 4, GFR 15-29 ml/min  -     Ambulatory referral to Home Health  -     Ambulatory referral to Outpatient Case Management    Calcification of aorta  -     Ambulatory referral to Home Health  -     Ambulatory referral to Outpatient Case Management    Pulmonary artery hypertension  -     Ambulatory referral to Home Health  -     Ambulatory referral to Outpatient Case Management    Dressing changed  Ordered Home Health for PT and wound care, [facility].              Care Plan/Goals: Reviewed  (Yes)  Goals     None          Follow up: No Follow-up on file.    After visit summary was printed and given to patient upon discharge today.  Patient goals and care plan are included in After Visit Summary.

## 2018-12-04 ENCOUNTER — TELEPHONE (OUTPATIENT)
Dept: INTERNAL MEDICINE | Facility: CLINIC | Age: 83
End: 2018-12-04

## 2018-12-04 NOTE — TELEPHONE ENCOUNTER
Unable to reach [facility] to ensure order was received. Answering service answered. Will reach out tomorrow to ensure it was received.

## 2018-12-04 NOTE — TELEPHONE ENCOUNTER
----- Message from Sandra Meyer sent at 12/4/2018  3:34 PM CST -----  Contact: Patient  Patient called and stated that she hasn't heard from the Home Health people that were supposed to be calling her yesterday or today. She can be contacted at [phone].    Thanks,  Sandra

## 2018-12-05 ENCOUNTER — OUTPATIENT CASE MANAGEMENT (OUTPATIENT)
Dept: ADMINISTRATIVE | Facility: OTHER | Age: 83
End: 2018-12-05

## 2018-12-05 ENCOUNTER — TELEPHONE (OUTPATIENT)
Dept: INTERNAL MEDICINE | Facility: CLINIC | Age: 83
End: 2018-12-05

## 2018-12-05 NOTE — TELEPHONE ENCOUNTER
----- Message from Yuliet Gallegos sent at 12/5/2018 12:55 PM CST -----  Contact: [facility](Marilee)[phone]  Would like to consult with nurse regarding admitted patient today, please call back at [phone]. Thanks/ar

## 2018-12-05 NOTE — TELEPHONE ENCOUNTER
Called home health to find out if referral was received. All the nurses were in a meeting. Someone is supposed to call me back to follow up.

## 2018-12-05 NOTE — TELEPHONE ENCOUNTER
Called and spoke with Marilee with home health and let patient know that they will be coming out today.

## 2018-12-05 NOTE — PROGRESS NOTES
Thank you for the referral.  Patient has been assigned to Neda Palomo LMSW for low risk screening for Outpatient Case Management.     Reason for referral:   Essential hypertension  Coronary artery disease involving native coronary artery of native heart without angina pectoris  Pacemaker  Acquired hypothyroidism  History of CVA (cerebrovascular accident)  Hyperlipidemia, unspecified hyperlipidemia type  Hypertensive renal disease  Leg pain, bilateral  Decreased mobility  Carotid atherosclerosis, unspecified laterality  Diastolic dysfunction  Aortic valve disease  Aortic valve stenosis, etiology of cardiac valve disease unspecified  CKD (chronic kidney disease) stage 4, GFR 15-29 ml/min  Calcification of aorta  Pulmonary artery hypertension    Patient needs to see services that she can get at home to help her manage day to day.  I referred for home PT due to falls.    Please contact [facility] at [place]. [phone] with any questions.    Thank you,    Yanet Michaels    [facility]

## 2018-12-06 ENCOUNTER — TELEPHONE (OUTPATIENT)
Dept: INTERNAL MEDICINE | Facility: CLINIC | Age: 83
End: 2018-12-06

## 2018-12-06 NOTE — TELEPHONE ENCOUNTER
----- Message from Anahi Lockett sent at 12/6/2018  9:56 AM CST -----  Contact: [facility]  Caller needs call back rg patient medication [phone]

## 2018-12-07 ENCOUNTER — OUTPATIENT CASE MANAGEMENT (OUTPATIENT)
Dept: ADMINISTRATIVE | Facility: OTHER | Age: 83
End: 2018-12-07

## 2018-12-07 ENCOUNTER — TELEPHONE (OUTPATIENT)
Dept: INTERNAL MEDICINE | Facility: CLINIC | Age: 83
End: 2018-12-07

## 2018-12-07 NOTE — TELEPHONE ENCOUNTER
sherrie asked if she can get wound care orders to clean and wrap wound two times a week normal saline or wound cleanser with a non stick Neto

## 2018-12-07 NOTE — TELEPHONE ENCOUNTER
----- Message from Gino Colon sent at 12/7/2018 12:17 PM CST -----  Contact: [facility] Nurse Tamy   [facility] to have the Pt  wound care orders . Phone#[phone] Nell

## 2018-12-10 ENCOUNTER — OUTPATIENT CASE MANAGEMENT (OUTPATIENT)
Dept: ADMINISTRATIVE | Facility: OTHER | Age: 83
End: 2018-12-10

## 2018-12-10 NOTE — LETTER
December 10, 2018    Sherly Franco  [address] [place] [address]             [facility] [address] [place] [address] Dear Ms. Franco:    I am writing from the Outpatient Complex Care Management Department at [facility].  I received a referral from Dr. Cowan to contact you or your caregiver regarding any needs you may have. I have attempted to contact you or your cargeiver by phone two times unsuccessfully.  Please contact the Outpatient Complex Care Management Department at [phone] if you would like to discuss your needs.      Sincerely,       Neda Palomo, [facility]

## 2018-12-10 NOTE — PROGRESS NOTES
This LMSW attempted to reach patient/caregiver to provide resource and left msg requesting a return call.  Letter with contact information was sent via US mail to patient/caregiver.  Referral source notified.

## 2018-12-19 ENCOUNTER — TELEPHONE (OUTPATIENT)
Dept: ADMINISTRATIVE | Facility: CLINIC | Age: 83
End: 2018-12-19

## 2018-12-19 NOTE — TELEPHONE ENCOUNTER
Home Health SOC 12/05/2018 - 02/02/2019 with [facility] ([facility]) - Dr. Racquel Cowan. Patient received SN and PT services.

## 2019-01-11 RX ORDER — LEVOTHYROXINE SODIUM 75 UG/1
75 TABLET ORAL DAILY
Qty: 90 TABLET | Refills: 0 | Status: SHIPPED | OUTPATIENT
Start: 2019-01-11 | End: 2019-04-10 | Stop reason: SDUPTHER

## 2019-01-11 NOTE — TELEPHONE ENCOUNTER
----- Message from Venessa Tobias sent at 1/11/2019  1:54 PM CST -----  Contact: pt  She's calling in regards to refill       1. What is the name of the medication you are requesting? levothyroxine  2. What is the dose? 75mg  3. How do you take the medication? Orally  4. How often do you take this medication? Once everyday  5. Do you need a 30 day or 90 day supply?90 days  6. How many refills are you requesting? 3  7. What is your preferred pharmacy and location of the pharmacy?   [facility] [id number] [facility] [address]   [address]  [place] [address]  Phone: [phone] Fax: [phone]  8. Who can we contact with further questions? [phone] (home)

## 2019-01-11 NOTE — TELEPHONE ENCOUNTER
I tried to call the patient to let her know that I sent the refill request to Dr. Cowan but I didn't get an answer. The line was busy.

## 2019-01-11 NOTE — TELEPHONE ENCOUNTER
----- Message from Pillo Espinoza sent at 1/11/2019  3:32 PM CST -----  Contact: Pt   States she's calling rg not hearing back on the and needs a refill and can be reached at [phone] / pls call when called in today//thanks/dbw     1. What is the name of the medication you are requesting? levothyroxine  2. What is the dose? 75 mg   3. How do you take the medication? Orally, topically, etc? Orally   4. How often do you take this medication? Once daily   5. Do you need a 30 day or 90 day supply? 90 days  6. How many refills are you requesting?   7. What is your preferred pharmacy and location of the pharmacy?   8. Who can we contact with further questions? Pt       [id number] [address] [address]  [address] [place] [address]  Phone: [phone] Fax: [phone]

## 2019-01-31 ENCOUNTER — PATIENT OUTREACH (OUTPATIENT)
Dept: ADMINISTRATIVE | Facility: HOSPITAL | Age: 84
End: 2019-01-31

## 2019-04-10 RX ORDER — LEVOTHYROXINE SODIUM 75 UG/1
TABLET ORAL
Qty: 90 TABLET | Refills: 0 | Status: SHIPPED | OUTPATIENT
Start: 2019-04-10 | End: 2019-07-10 | Stop reason: SDUPTHER

## 2019-07-10 RX ORDER — LEVOTHYROXINE SODIUM 75 UG/1
TABLET ORAL
Qty: 90 TABLET | Refills: 0 | Status: SHIPPED | OUTPATIENT
Start: 2019-07-10

## 2020-02-16 RX ORDER — LEVOTHYROXINE SODIUM 75 UG/1
TABLET ORAL
Qty: 90 TABLET | Refills: 0 | OUTPATIENT
Start: 2020-02-16

## 2020-02-16 NOTE — TELEPHONE ENCOUNTER
Call patient.  She has not been seen in over a year.  No recent labs.  Lab Results       Component                Value               Date                       TSH                      1.949               05/08/2017            I cannot fill unless seen.

## 2020-06-12 NOTE — TELEPHONE ENCOUNTER
Patient notified of results and states she checked her medications at home that she did not bring to the appointment yesterday. Medications are Metoprolol 25mg, Clopidogrel 75mg, Levothyroxine 75mcg, and Furosemide 40mg. Patient states she is no longer taking Potassium and Temazepam. Patient states she is taking Tramadol. Advised that a message was left for 's office for copy of medication list, patient verbalized understanding.    Silver Nitrate Text: The wound bed was treated with silver nitrate after the biopsy was performed.